# Patient Record
Sex: FEMALE | Race: WHITE
[De-identification: names, ages, dates, MRNs, and addresses within clinical notes are randomized per-mention and may not be internally consistent; named-entity substitution may affect disease eponyms.]

---

## 2017-01-17 ENCOUNTER — HOSPITAL ENCOUNTER (OUTPATIENT)
Dept: HOSPITAL 75 - ONC | Age: 82
LOS: 90 days | Discharge: HOME | End: 2017-04-17
Attending: INTERNAL MEDICINE
Payer: MEDICARE

## 2017-01-17 DIAGNOSIS — Z79.899: ICD-10-CM

## 2017-01-17 DIAGNOSIS — I10: ICD-10-CM

## 2017-01-17 DIAGNOSIS — E78.00: ICD-10-CM

## 2017-01-17 DIAGNOSIS — E03.9: ICD-10-CM

## 2017-01-17 DIAGNOSIS — I25.10: ICD-10-CM

## 2017-01-17 DIAGNOSIS — C18.7: Primary | ICD-10-CM

## 2017-01-17 DIAGNOSIS — E11.9: ICD-10-CM

## 2017-01-17 LAB
ALBUMIN SERPL-MCNC: 4.1 G/DL (ref 3.2–4.5)
ALT SERPL-CCNC: 13 U/L (ref 0–55)
ANION GAP SERPL CALC-SCNC: 10 MMOL/L (ref 5–14)
AST SERPL-CCNC: 15 U/L (ref 5–34)
BASOPHILS # BLD AUTO: 0.1 10^3/UL (ref 0–0.1)
BASOPHILS NFR BLD AUTO: 1 % (ref 0–10)
BILIRUB SERPL-MCNC: 0.3 MG/DL (ref 0.1–1)
BUN SERPL-MCNC: 19 MG/DL (ref 7–18)
BUN/CREAT SERPL: 17
CALCIUM SERPL-MCNC: 9 MG/DL (ref 8.5–10.1)
CHLORIDE SERPL-SCNC: 107 MMOL/L (ref 98–107)
CO2 SERPL-SCNC: 23 MMOL/L (ref 21–32)
CREAT SERPL-MCNC: 1.13 MG/DL (ref 0.6–1.3)
EOSINOPHIL # BLD AUTO: 0.2 10^3/UL (ref 0–0.3)
EOSINOPHIL NFR BLD AUTO: 3 % (ref 0–10)
ERYTHROCYTE [DISTWIDTH] IN BLOOD BY AUTOMATED COUNT: 13.7 % (ref 10–14.5)
GFR SERPLBLD BASED ON 1.73 SQ M-ARVRAT: 45 ML/MIN
GLUCOSE SERPL-MCNC: 146 MG/DL (ref 70–105)
LYMPHOCYTES # BLD AUTO: 2.4 X 10^3 (ref 1–4)
LYMPHOCYTES NFR BLD AUTO: 28 % (ref 12–44)
MCH RBC QN AUTO: 32 PG (ref 25–34)
MCHC RBC AUTO-ENTMCNC: 34 G/DL (ref 32–36)
MCV RBC AUTO: 96 FL (ref 80–99)
MONOCYTES # BLD AUTO: 0.6 X 10^3 (ref 0–1)
MONOCYTES NFR BLD AUTO: 7 % (ref 0–12)
NEUTROPHILS # BLD AUTO: 5.4 X 10^3 (ref 1.8–7.8)
NEUTROPHILS NFR BLD AUTO: 62 % (ref 42–75)
PLATELET # BLD: 173 10^3/UL (ref 130–400)
PMV BLD AUTO: 9.9 FL (ref 7.4–10.4)
POTASSIUM SERPL-SCNC: 4.5 MMOL/L (ref 3.6–5)
PROT SERPL-MCNC: 6.5 G/DL (ref 6.4–8.2)
RBC # BLD AUTO: 4.04 10^6/UL (ref 4.35–5.85)
SODIUM SERPL-SCNC: 140 MMOL/L (ref 135–145)
WBC # BLD AUTO: 8.7 10^3/UL (ref 4.3–11)

## 2017-01-17 PROCEDURE — 85025 COMPLETE CBC W/AUTO DIFF WBC: CPT

## 2017-01-17 PROCEDURE — 36415 COLL VENOUS BLD VENIPUNCTURE: CPT

## 2017-01-17 PROCEDURE — 99213 OFFICE O/P EST LOW 20 MIN: CPT

## 2017-01-17 PROCEDURE — 80053 COMPREHEN METABOLIC PANEL: CPT

## 2017-01-17 PROCEDURE — 82378 CARCINOEMBRYONIC ANTIGEN: CPT

## 2017-01-17 PROCEDURE — 84443 ASSAY THYROID STIM HORMONE: CPT

## 2017-01-17 NOTE — XMS REPORT
Continuity of Care Document

 Created on: 2016



VIOLETA TELLO

External Reference #: [id number]

: 1928

Sex: Female



Demographics







 Address  [address]

[place]  [address]

 

 Home Phone  [phone]

 

 Preferred Language  English

 

 Marital Status  Unknown

 

 Caodaism Affiliation  Unknown

 

 Race  Unknown

 

 Ethnic Group  Unknown





Author







 Author  [facility]

 

 Organization  [facility]

 

 Address  Unknown

 

 Phone  Unavailable







Support







 Name  Relationship  Address  Phone

 

 EDUARDO WILSON MD  Caregiver  Unknown  [phone]

 

 ROSE GUADARRAMA DO  Caregiver  [address]

[address]

[place]  [address] [phone]

 

 AUGUSTUS MCGHEEA JARETT DO  Caregiver  [facility] EMERGENCY DEPT

[place]  [address] [phone]

 

 MARITO BLOCK  Next Of Kin  [address] [place]  [address] [phone]







Care Team Providers







 Care Team Member Name  Role  Phone

 

 EDUARDO WILSON MD  PCP  [phone]







Insurance Providers







 Payer Name  Policy Number  Subscriber Name  Relationship

 

 [employer]  [id number]  Violeta Tello  18 Self / Same As Patient

 

 [employer] [facility] Supp  [id number]  Violeta Tello  18 Self / Same As 
Patient







Advance Directives







 Directive  Response  Recorded Date/Time

 

 Advance Directives  Yes  16 2:05am

 

 Health Care Power of   Yes  16 2:05am

 

 Organ Donor  No  16 2:05am

 

 Resuscitation Status  Full Code  16 2:05am







Chief Complaint and Reason for Visit







 Chief Complaint  SMALL BOWEL OBSTRUCTION DUE TO ADHESIONS

 

 Reason for Visit  Acute renal insufficiency

Hypertension

Hypothyroid

Small bowel obstruction

Small bowel obstruction due to adhesions







Problems

Active Problems







 Medical Problem  Onset Date  Status

 

 Acute renal insufficiency  Unknown  Resolved

 

 Chest pain of uncertain etiology  Unknown  Acute

 

 Hypertension  Unknown  Chronic

 

 Hypothyroid  Unknown  Chronic

 

 Small bowel obstruction  Unknown  Acute

 

 Small bowel obstruction due to adhesions  Unknown  Resolved







Medications

Current Home Medications







 Medication  Dose  Units  Route  Directions  Days/Qty  Instructions  Start Date

 

 Atenolol 50 Mg  50  Mg  Oral  Daily        09

 

 Losartan Potassium 100 Mg  100  Mg  Oral  Daily        13

 

 Aspirin 325 Mg  325  Mg  Oral  Bedtime        13

 

 Folic Acid/Mv,Fe,Other Min 1 Each  1  Tab  Oral  Daily        14

 

 Cholecalciferol (Vitamin D3) 400 Unit  400  Unit  Oral  Daily        10/22/15

 

 Krill/Om-3/Dha/Epa/Phospho/Ast 1 Each  1  Cap  Oral  Bedtime        10/22/15

 

 Levothyroxine Sodium 75 Mcg  75  Mcg  Oral  Bedtime        16





Past Home Medications







 Medication  Directions  Ordered  Status

 

 Multivitamins 1 Tab Tablet, 1 Tab Oral  Daily  09  Discontinued

 

 Vitamin E 400 Unit Capsule, 400 U Oral  Daily  09  Discontinued

 

 Levothyroxine Sodium (Levothroid) 150 Mcg Tablet, 150 Mcg Oral  Daily    Discontinued

 

 Olmesartn/Hydrochlorothiazide 1 Tab Tablet, 12.5 Mg Oral  Daily  09  
Discontinued

 

 Carisoprodol 350 Mg Tablet, 350 Mg Oral  As Needed  09  Discontinued

 

 Hydrocodone Bit/Acetaminophen 1 Each Capsule, 1 Tab Oral  As Needed  09  
Discontinued

 

 Naproxen Sodium 220 Mg Tablet, 220 Mg Oral  As Needed  09  Discontinued

 

 Acetaminophen 500 Mg Tablet, 500 Mg Oral  As Needed  09  Discontinued

 

 Promethazine Hcl/Codeine 5 Ml Syrp,     09  Discontinued

 

 Clarithromycin 500 Mg Tab,     09  Discontinued

 

 Prednisone 2.5 Mg Tablet,     09  Discontinued

 

 Alendronate Sodium 70 Mg Tab, 70 Mg Oral  Daily  09  Discontinued

 

 Levothyroxine Sodium 175 Mcg Tablet, 175 Mcg Oral  Bedtime  11  
Discontinued

 

 Lovastatin (Mevacor) 20 Mg Tablet, 40 Mg Oral  Bedtime  11  Discontinued

 

 Fish Oil 1,000 Mg Cap, 1000 Mg Oral  Three Times A Day  11  Discontinued

 

 Atenolol 50 Mg Tablet, 1 Each Oral  Daily  11  Discontinued

 

 Olmesartan 20 Mg Tablet, 40 Mg Oral  Daily  11  Discontinued

 

 Ca Cmb No.1/Vit D3/B-6/Fa/B12 1 Each Tablet, 1 Each Oral  Daily  11  
Discontinued

 

 Clopidogrel Bisulfate 75 Mg Tablet, 1 Each Oral  Daily  12  Discontinued

 

 Aspirin 81 Mg Tabec, 81 Mg Oral  Daily  12  Discontinued

 

 Aspirin 325 Mg Tab, 325 Mg Oral  Daily  12  Discontinued

 

 Naproxen Sodium 220 Mg Capsule, 220 Mg Oral  As Needed  12  Discontinued

 

 Vitamin E 400 Unit Capsule, 400 Unit Oral  Daily  12  Discontinued

 

 Cholecalciferol 1,000 Unit Tablet, 1000 Unit Oral  Daily  12  
Discontinued

 

 [Ibuprofen] ,  As Needed  12  Discontinued

 

 Cholecalciferol (Vitamin D3) 400 Unit Tablet, 400 Unit Oral  Daily  13  
Discontinued

 

 Levothyroxine Sodium (Levothroid) 150 Mcg Tablet, 150 Mcg Oral  Bedtime    Discontinued

 

 Krill Oil/Omega-3/Dha/Epa 1 Each Capsule, 1 Cap Oral  Twice A Day  13  
Discontinued

 

 Acetaminophen 325 Mg Tab, 650 Mg Oral  Every 6 Hours as needed  13  
Discontinued

 

 Enoxaparin Sodium 30 Mg/0.3 Ml Disp.syrin, 30 Mg Sub-Q  Give Every 12 Hrs On 
Schedule  13  Discontinued

 

 Losartan Potassium 25 Mg Tablet, 25 Mg Oral  Daily  13  Discontinued

 

 Ondansetron Hcl 4 Mg/2 Ml Soln, 4 Mg Intraven  Every 6 Hours as needed    Discontinued

 

 Oxycodone Hcl/Acetaminophen 1 Each Tablet, 1 Each Oral  Q2hrs as needed    Discontinued

 

 Senna 1 Ea Tablet, 2 Ea Oral  Twice A Day  13  Discontinued

 

 [Cathete] , 10 Ml Intraven  As Needed as needed  13  Discontinued

 

 [Cath] , 10 Ml Intraven  Every 8HRS  13  Discontinued

 

 Temazepam 15 Mg Capsule, 15 Mg Oral  Bedtime as needed  13  Discontinued

 

 Cholecalciferol (Vitamin D3) 400 Unit Tablet, 400 Unit Oral  Daily  13  
Discontinued

 

 [Saline Flush] , 10 Ml Intraven  Every 8HRS  13  Discontinued

 

 [Saline Flush] , 10 Ml Intraven  As Needed  13  Discontinued

 

 Oxycodone Hcl/Acetaminophen 1 Each Tablet, 1 Each Oral  Bedtime  13  
Discontinued

 

 Losartan Potassium 25 Mg Tablet, 25 Mg Oral  Daily  13  Discontinued

 

 Senna 1 Tab Tablet, 2 Tab Oral  Twice A Day as needed  13  Discontinued

 

 Ciprofloxacin 500 Mg Tablet, 1 Tab Oral  Twice A Day  13  Discontinued

 

 Vitamin E 400 Unit Capsule, 400 Unit Oral  Daily  09/10/13  Discontinued

 

 [Nicholas Red] , 1 Cap Oral  Bedtime  09/10/13  Discontinued

 

 Lovastatin (Mevacor) 40 Mg Tablet, 40 Mg Oral  Bedtime  09/10/13  Discontinued

 

 Atorvastatin Calcium 10 Mg Tablet, 10 Mg Oral  Bedtime  14  Discontinued

 

 Vitamin E (Dl,Tocopheryl Acet) 200 Unit Capsule, 200 Unit Oral  Daily    Discontinued

 

 Atorvastatin Calcium 20 Mg Tablet, 20 Mg Oral  Bedtime  14  Discontinued

 

 Famotidine (Pepcid) 20 Mg Tablet, 1 Each Oral  Daily  14  Discontinued

 

 Vitamin E (Dl,Tocopheryl Acet) 200 Unit Capsule, 200 Unit Oral  Daily  10/22/
15  Discontinued

 

 Omeprazole 20 Mg Capsule.dr, 20 Mg Oral  Daily  10/23/15  Discontinued







Social History







 Social History Problem  Response  Recorded Date/Time

 

 Alcohol Use  Denies Use  2016 2:05am

 

 Recreational Drug Use  No  2016 2:05am

 

 Recent Foreign Travel  No  2014 10:30am

 

 Recent Infectious Disease Exposure  No  2014 10:30am

 

 Hospitalization with Isolation  Denies  2014 10:30am

 

 Smoking Status  Current Everyday Smoker  2016 2:05am

 

 Do you dip or chew tobacco?  No  2016 9:21pm









 Query  Response  Start Date  Stop Date

 

 Smoking Status  Current Everyday Smoker     2015







Hospital Discharge Instructions

No hospital discharge instructions.



Plan of Care







 Discharge Date  16 2:10pm

 

 Disposition  01 HOME, SELF-CARE

 

 Instructions/Education Provided  Bowel Obstruction (DC)

 

 Forms Provided  [facility]

 

 Prescriptions  See Medication Section

 

 Referrals   (Unspecified) - 1 Week







Functional Status







 Query  Response  Date Recorded

 

 Patient Orientation  Person

Place

Time

Situation

  2016 2:57pm

 

 Comprehension Ability  Understands Concepts

  2016 2:05am







Allergies, Adverse Reactions, Alerts







 Allergen  Type  Severity  Reaction  Status  Last Updated

 

 Sulfa (Sulfonamide Antibiotics) ([id number])  Allergy  Unknown     Active  







Immunizations







 Name  Given  Type

 

 Date of Pneumonia Vaccine  10/01/13  Historical

 

 Date of Influenza Vaccine  10/01/13  Historical

 

 Tetanus Booster (TDap)  Unknown  Historical







Vital Signs

Acute Vital Signs





 Vital  Response  Date/Time

 

 Temperature (Fahrenheit)  98.8 degrees F (97.6 - 99.5)  2016 2:05pm

 

 Temperature (Calculated Celsius)  37.07043 degrees C (36.4 - 37.5)  2016 
10:00am

 

 Temperature Source  Tympanic  2016 2:05pm

 

 Pulse Rate (adult)  60 bpm (60 - 90)  2016 2:05pm

 

 Respiratory Rate  16 bpm (12 - 24)  2016 2:05pm

 

 O2 Sat by Pulse Oximetry  93 % (88 - 100)  2016 2:05pm

 

 Blood Pressure  131/62 mm Hg  2016 2:05pm

 

    Blood Pressure Mean  85 mm Hg  2016 10:00am

 

 Pain      

 

    Pain Intensity  0  2016 10:00am

 

 Height (Feet)  5 feet  2016 2:05am

 

 Height (Inches)  1.00 inches  2016 2:05am

 

 Height (Calculated Centimeters)  154.762143 cm  2016 2:05am

 

 Weight (Pounds)  142 pounds  2016 2:05am

 

 Weight (Ounces)  0.0 oz  2016 2:05am

 

 Weight (Calculated Grams)  25310.117 gm  2016 2:05am

 

 Weight (Calculated Kilograms)  64.260029 kilograms  2016 2:05am

 

 Calculated BMI  26.8  2016 2:05am







Results

Laboratory Results







 Test Name  Result  Units  Flags  Reference  Collection Date/Time  Result Date/
Time  Comments

 

 White Blood Count  8.1  10^3/uL     4.3-11.0  2016 5:17am  2016 5:
52am   

 

 Red Blood Count  3.53  10^6/uL  L  4.35-5.85  2016 5:2016 5:
52am   

 

 Hemoglobin  11.3  G/DL  L  11.5-16.0  2016 5:2016 5:52am   

 

 Hematocrit  35  %     35-52  2016 5:2016 5:52am   

 

 Mean Corpuscular Volume  98  FL     80-99  2016 5:2016 5:
52am   

 

 Mean Corpuscular Hemoglobin  32  PG     25-34  2016 5:17a2016 5:
52am   

 

 Mean Corpuscular Hemoglobin Concent  33  G/DL     32-36  2016 5:17a 5:52am   

 

 Red Cell Distribution Width  13.8  %     10.0-14.5  2016 5:17am  2016 5:52am   

 

 Platelet Count  133  10^3/uL     130-400  2016 5:2016 5:52am
   

 

 Mean Platelet Volume  11.0  FL  H  7.4-10.4  2016 5:2016 5:
52am   

 

 Neutrophils (%) (Auto)  59  %     42-75  2016 5:2016 5:52am 
  

 

 Lymphocytes (%) (Auto)  28  %     12-44  2016 5:2016 5:52am 
  

 

 Monocytes (%) (Auto)  8  %     0-12  2016 5:2016 5:52am   

 

 Eosinophils (%) (Auto)  5  %     0-10  2016 5:2016 5:52am   

 

 Basophils (%) (Auto)  0  %     0-10  2016 5:2016 5:52am   

 

 Neutrophils # (Auto)  4.8  X 10^3     1.8-7.8  2016 5:2016 5:
52am   

 

 Lymphocytes # (Auto)  2.3  X 10^3     1.0-4.0  2016 5:2016 5:
52am   

 

 Monocytes # (Auto)  0.6  X 10^3     0.0-1.0  2016 5:2016 5:
52am   

 

 Eosinophils # (Auto)  0.4  10^3/uL  H  0.0-0.3  2016 5:2016 5
:52am   

 

 Basophils # (Auto)  0.0  10^3/uL     0.0-0.1  2016 5:2016 5:
52am   

 

 Neutrophils % (Manual)  68  %        2016 9:20pm  2016 10:13pm   

 

 Band Neutrophils  1  %        2016 9:20pm  2016 10:13pm   

 

 Lymphocytes % (Manual)  28  %        2016 9:20pm  2016 10:13pm   

 

 Monocytes % (Manual)  1  %        2016 9:20pm  2016 10:13pm   

 

 Eosinophils % (Manual)  1  %        2016 9:20pm  2016 10:13pm   

 

 Basophils % (Manual)  1  %        2016 9:20pm  2016 10:13pm   

 

 Blood Morphology Comment  NORMAL           2016 9:20pm  2016 10:
13pm   

 

 Urine Color  YELLOW           2016 4:37am  2016 4:53am   

 

 Urine Clarity  VERY CLOUDY     *     2016 4:37am  2016 4:53am   

 

 Urine pH  6        5-9  2016 4:37am  2016 4:53am   

 

 Urine Specific Gravity  1.015     *  1.016-1.022  2016 4:37am  2016 4:53am   

 

 Urine Protein  2+     *  NEGATIVE  2016 4:37am  2016 4:53am   

 

 Urine Glucose (UA)  NEGATIVE        NEGATIVE  2016 4:37am  2016 4:
53am   

 

 Urine RBC (Auto)  NEGATIVE        NEGATIVE  2016 4:37am  2016 4:
53am   

 

 Urine Ketones  NEGATIVE        NEGATIVE  2016 4:37am  2016 4:53am 
  

 

 Urine Nitrite  NEGATIVE        NEGATIVE  2016 4:37am  2016 4:53am 
  

 

 Urine Bilirubin  NEGATIVE        NEGATIVE  2016 4:37am  2016 4:
53am   

 

 Urine Urobilinogen  1  MG/DL     NORMAL  2016 4:37am  2016 4:53am 
  

 

 Urine Leukocyte Esterase  2+     *  NEGATIVE  2016 4:37am  2016 4:
53am   

 

 Urine RBC  NONE  /HPF        2016 4:37am  2016 4:53am   

 

 Urine WBC  10-25  /HPF  *     2016 4:37am  2016 4:53am   

 

 Urine Bacteria  LARGE  /HPF  *     2016 4:37am  2016 4:53am   

 

 Urine Squamous Epithelial Cells  NONE  /HPF        2016 4:37am  2016 4:53am   

 

 Urine Crystals  NONE  /LPF        2016 4:37am  2016 4:53am   

 

 Urine Casts  NONE  /LPF        2016 4:37am  2016 4:53am   

 

 Urine Mucus  NEGATIVE  /LPF        2016 4:37am  2016 4:53am   

 

 Urine Culture Indicated  YES           2016 4:37am  2016 4:53am   

 

 Sodium Level  141  MMOL/L     135-145  2016 5:2016 6:12am   

 

 Potassium Level  4.3  MMOL/L     3.6-5.0  2016 5:2016 6:12am
   

 

 Chloride Level  108  MMOL/L  H    2016 5:2016 6:12am   

 

 Carbon Dioxide Level  24  MMOL/L     21-32  2016 5:2016 6:
12am   

 

 Anion Gap  9  MMOL/L     5-14  2016 5:2016 6:12am   

 

 Blood Urea Nitrogen  22  MG/DL  H  7-18  2016 5:2016 6:12am 
  

 

 Creatinine  1.14  MG/DL     0.60-1.30  2016 5:2016 6:12am   

 

 BUN/Creatinine Ratio  19           2016 5:2016 6:12am   

 

 Estimat Glomerular Filtration Rate  45           2016 5:2016 
6:12am                    GFR INTERPRETIVE DATA  

  

         UNITS FOR ESTIMATED GFR (eGFR): mL/min/1.73 M2  

         REFERENCE RANGE FOR ESTIMATED GFR (eGFR)  

                                          eGFR  

         NORMAL eGFR                       >60  

         MODERATELY DECREASED eGFR          30-59  

         SEVERLY DECREASED eGFR             15-29  

         KIDNEY FAILURE                    <15 (OR DIALYSIS)  

 

 Glucose Level  133  MG/DL  H    2016 5:2016 6:12am   

 

 Calcium Level  8.4  MG/DL  L  8.5-10.1  2016 5:2016 6:12am   

 

 Total Bilirubin  0.7  MG/DL     0.1-1.0  2016 5:2016 6:12am 
  

 

 Alkaline Phosphatase  45  U/L       2016 5:2016 6:12am
   

 

 Aspartate Amino Transf (AST/SGOT)  16  U/L     5-34  2016 5:2016 6:12am   

 

 Alanine Aminotransferase (ALT/SGPT)  12  U/L     0-55  2016 5:17am   6:12am   

 

 Total Protein  5.0  G/DL  L  6.4-8.2  2016 5:17am  2016 6:12am   

 

 Albumin  3.2  G/DL     3.2-4.5  2016 5:17am  2016 6:12am   

 

 Amylase Level  60  U/L       2016 9:20pm  2016 10:06pm   

 

 Lipase  11  U/L     8-78  2016 9:20pm  2016 10:06pm   





Microbiology Results







 Procedure  Source  Result  Collection Date/Time  Result Date/Time

 

 Urine Culture  Urine, Clean Catch  PROBABLE E.COLI  2016 4:37am  2016 7:44am







Procedures

No known history of procedures.



Encounters







 Encounter  Location  Arrival/Admit Date  Discharge/Depart Date  Attending 
Provider

 

 Discharged Inpatient  [facility]  16 1:29am   2:10pm  ROSE GUADARRAMA DO











 Recent Diagnosis

 

 Acute renal insufficiency

 

 Hypertension

 

 Hypothyroid

 

 Small bowel obstruction

 

 Small bowel obstruction due to adhesions

## 2017-01-31 ENCOUNTER — HOSPITAL ENCOUNTER (OUTPATIENT)
Dept: HOSPITAL 75 - RAD | Age: 82
End: 2017-01-31
Attending: NURSE PRACTITIONER
Payer: MEDICARE

## 2017-01-31 DIAGNOSIS — Z99.81: ICD-10-CM

## 2017-01-31 DIAGNOSIS — R05: Primary | ICD-10-CM

## 2017-01-31 DIAGNOSIS — R07.81: ICD-10-CM

## 2017-01-31 DIAGNOSIS — Z91.81: ICD-10-CM

## 2017-01-31 PROCEDURE — 71020: CPT

## 2017-01-31 PROCEDURE — 71100 X-RAY EXAM RIBS UNI 2 VIEWS: CPT

## 2017-01-31 NOTE — XMS REPORT
Continuity of Care Document

 Created on: 2016



VIOLETA TELLO

External Reference #: [id number]

: 1928

Sex: Female



Demographics







 Address  [address]

[place]  [address]

 

 Home Phone  [phone]

 

 Preferred Language  English

 

 Marital Status  Unknown

 

 Buddhism Affiliation  Unknown

 

 Race  Unknown

 

 Ethnic Group  Unknown





Author







 Author  [facility]

 

 Organization  [facility]

 

 Address  Unknown

 

 Phone  Unavailable







Support







 Name  Relationship  Address  Phone

 

 EDUARDO WILSON MD  Caregiver  Unknown  [phone]

 

 ROSE GUADARRAMA DO  Caregiver  [address]

[address]

[place]  [address] [phone]

 

 AUGUSTUS MCGHEEA JARETT DO  Caregiver  [facility] EMERGENCY DEPT

[place]  [address] [phone]

 

 MARITO BLOCK  Next Of Kin  [address] [place]  [address] [phone]







Care Team Providers







 Care Team Member Name  Role  Phone

 

 EDUARDO WILSON MD  PCP  [phone]







Insurance Providers







 Payer Name  Policy Number  Subscriber Name  Relationship

 

 [employer]  [id number]  Violeta Tello  18 Self / Same As Patient

 

 [employer] [facility] Supp  [id number]  Violeta Tello  18 Self / Same As 
Patient







Advance Directives







 Directive  Response  Recorded Date/Time

 

 Advance Directives  Yes  16 2:05am

 

 Health Care Power of   Yes  16 2:05am

 

 Organ Donor  No  16 2:05am

 

 Resuscitation Status  Full Code  16 2:05am







Chief Complaint and Reason for Visit







 Chief Complaint  SMALL BOWEL OBSTRUCTION DUE TO ADHESIONS

 

 Reason for Visit  Acute renal insufficiency

Hypertension

Hypothyroid

Small bowel obstruction

Small bowel obstruction due to adhesions







Problems

Active Problems







 Medical Problem  Onset Date  Status

 

 Acute renal insufficiency  Unknown  Resolved

 

 Chest pain of uncertain etiology  Unknown  Acute

 

 Hypertension  Unknown  Chronic

 

 Hypothyroid  Unknown  Chronic

 

 Small bowel obstruction  Unknown  Acute

 

 Small bowel obstruction due to adhesions  Unknown  Resolved







Medications

Current Home Medications







 Medication  Dose  Units  Route  Directions  Days/Qty  Instructions  Start Date

 

 Atenolol 50 Mg  50  Mg  Oral  Daily        09

 

 Losartan Potassium 100 Mg  100  Mg  Oral  Daily        13

 

 Aspirin 325 Mg  325  Mg  Oral  Bedtime        13

 

 Folic Acid/Mv,Fe,Other Min 1 Each  1  Tab  Oral  Daily        14

 

 Cholecalciferol (Vitamin D3) 400 Unit  400  Unit  Oral  Daily        10/22/15

 

 Krill/Om-3/Dha/Epa/Phospho/Ast 1 Each  1  Cap  Oral  Bedtime        10/22/15

 

 Levothyroxine Sodium 75 Mcg  75  Mcg  Oral  Bedtime        16





Past Home Medications







 Medication  Directions  Ordered  Status

 

 Multivitamins 1 Tab Tablet, 1 Tab Oral  Daily  09  Discontinued

 

 Vitamin E 400 Unit Capsule, 400 U Oral  Daily  09  Discontinued

 

 Levothyroxine Sodium (Levothroid) 150 Mcg Tablet, 150 Mcg Oral  Daily    Discontinued

 

 Olmesartn/Hydrochlorothiazide 1 Tab Tablet, 12.5 Mg Oral  Daily  09  
Discontinued

 

 Carisoprodol 350 Mg Tablet, 350 Mg Oral  As Needed  09  Discontinued

 

 Hydrocodone Bit/Acetaminophen 1 Each Capsule, 1 Tab Oral  As Needed  09  
Discontinued

 

 Naproxen Sodium 220 Mg Tablet, 220 Mg Oral  As Needed  09  Discontinued

 

 Acetaminophen 500 Mg Tablet, 500 Mg Oral  As Needed  09  Discontinued

 

 Promethazine Hcl/Codeine 5 Ml Syrp,     09  Discontinued

 

 Clarithromycin 500 Mg Tab,     09  Discontinued

 

 Prednisone 2.5 Mg Tablet,     09  Discontinued

 

 Alendronate Sodium 70 Mg Tab, 70 Mg Oral  Daily  09  Discontinued

 

 Levothyroxine Sodium 175 Mcg Tablet, 175 Mcg Oral  Bedtime  11  
Discontinued

 

 Lovastatin (Mevacor) 20 Mg Tablet, 40 Mg Oral  Bedtime  11  Discontinued

 

 Fish Oil 1,000 Mg Cap, 1000 Mg Oral  Three Times A Day  11  Discontinued

 

 Atenolol 50 Mg Tablet, 1 Each Oral  Daily  11  Discontinued

 

 Olmesartan 20 Mg Tablet, 40 Mg Oral  Daily  11  Discontinued

 

 Ca Cmb No.1/Vit D3/B-6/Fa/B12 1 Each Tablet, 1 Each Oral  Daily  11  
Discontinued

 

 Clopidogrel Bisulfate 75 Mg Tablet, 1 Each Oral  Daily  12  Discontinued

 

 Aspirin 81 Mg Tabec, 81 Mg Oral  Daily  12  Discontinued

 

 Aspirin 325 Mg Tab, 325 Mg Oral  Daily  12  Discontinued

 

 Naproxen Sodium 220 Mg Capsule, 220 Mg Oral  As Needed  12  Discontinued

 

 Vitamin E 400 Unit Capsule, 400 Unit Oral  Daily  12  Discontinued

 

 Cholecalciferol 1,000 Unit Tablet, 1000 Unit Oral  Daily  12  
Discontinued

 

 [Ibuprofen] ,  As Needed  12  Discontinued

 

 Cholecalciferol (Vitamin D3) 400 Unit Tablet, 400 Unit Oral  Daily  13  
Discontinued

 

 Levothyroxine Sodium (Levothroid) 150 Mcg Tablet, 150 Mcg Oral  Bedtime    Discontinued

 

 Krill Oil/Omega-3/Dha/Epa 1 Each Capsule, 1 Cap Oral  Twice A Day  13  
Discontinued

 

 Acetaminophen 325 Mg Tab, 650 Mg Oral  Every 6 Hours as needed  13  
Discontinued

 

 Enoxaparin Sodium 30 Mg/0.3 Ml Disp.syrin, 30 Mg Sub-Q  Give Every 12 Hrs On 
Schedule  13  Discontinued

 

 Losartan Potassium 25 Mg Tablet, 25 Mg Oral  Daily  13  Discontinued

 

 Ondansetron Hcl 4 Mg/2 Ml Soln, 4 Mg Intraven  Every 6 Hours as needed    Discontinued

 

 Oxycodone Hcl/Acetaminophen 1 Each Tablet, 1 Each Oral  Q2hrs as needed    Discontinued

 

 Senna 1 Ea Tablet, 2 Ea Oral  Twice A Day  13  Discontinued

 

 [Cathete] , 10 Ml Intraven  As Needed as needed  13  Discontinued

 

 [Cath] , 10 Ml Intraven  Every 8HRS  13  Discontinued

 

 Temazepam 15 Mg Capsule, 15 Mg Oral  Bedtime as needed  13  Discontinued

 

 Cholecalciferol (Vitamin D3) 400 Unit Tablet, 400 Unit Oral  Daily  13  
Discontinued

 

 [Saline Flush] , 10 Ml Intraven  Every 8HRS  13  Discontinued

 

 [Saline Flush] , 10 Ml Intraven  As Needed  13  Discontinued

 

 Oxycodone Hcl/Acetaminophen 1 Each Tablet, 1 Each Oral  Bedtime  13  
Discontinued

 

 Losartan Potassium 25 Mg Tablet, 25 Mg Oral  Daily  13  Discontinued

 

 Senna 1 Tab Tablet, 2 Tab Oral  Twice A Day as needed  13  Discontinued

 

 Ciprofloxacin 500 Mg Tablet, 1 Tab Oral  Twice A Day  13  Discontinued

 

 Vitamin E 400 Unit Capsule, 400 Unit Oral  Daily  09/10/13  Discontinued

 

 [Nicholas Red] , 1 Cap Oral  Bedtime  09/10/13  Discontinued

 

 Lovastatin (Mevacor) 40 Mg Tablet, 40 Mg Oral  Bedtime  09/10/13  Discontinued

 

 Atorvastatin Calcium 10 Mg Tablet, 10 Mg Oral  Bedtime  14  Discontinued

 

 Vitamin E (Dl,Tocopheryl Acet) 200 Unit Capsule, 200 Unit Oral  Daily    Discontinued

 

 Atorvastatin Calcium 20 Mg Tablet, 20 Mg Oral  Bedtime  14  Discontinued

 

 Famotidine (Pepcid) 20 Mg Tablet, 1 Each Oral  Daily  14  Discontinued

 

 Vitamin E (Dl,Tocopheryl Acet) 200 Unit Capsule, 200 Unit Oral  Daily  10/22/
15  Discontinued

 

 Omeprazole 20 Mg Capsule.dr, 20 Mg Oral  Daily  10/23/15  Discontinued







Social History







 Social History Problem  Response  Recorded Date/Time

 

 Alcohol Use  Denies Use  2016 2:05am

 

 Recreational Drug Use  No  2016 2:05am

 

 Recent Foreign Travel  No  2014 10:30am

 

 Recent Infectious Disease Exposure  No  2014 10:30am

 

 Hospitalization with Isolation  Denies  2014 10:30am

 

 Smoking Status  Current Everyday Smoker  2016 2:05am

 

 Do you dip or chew tobacco?  No  2016 9:21pm









 Query  Response  Start Date  Stop Date

 

 Smoking Status  Current Everyday Smoker     2015







Hospital Discharge Instructions

No hospital discharge instructions.



Plan of Care







 Discharge Date  16 2:10pm

 

 Disposition  01 HOME, SELF-CARE

 

 Instructions/Education Provided  Bowel Obstruction (DC)

 

 Forms Provided  [facility]

 

 Prescriptions  See Medication Section

 

 Referrals   (Unspecified) - 1 Week







Functional Status







 Query  Response  Date Recorded

 

 Patient Orientation  Person

Place

Time

Situation

  2016 2:57pm

 

 Comprehension Ability  Understands Concepts

  2016 2:05am







Allergies, Adverse Reactions, Alerts







 Allergen  Type  Severity  Reaction  Status  Last Updated

 

 Sulfa (Sulfonamide Antibiotics) ([id number])  Allergy  Unknown     Active  







Immunizations







 Name  Given  Type

 

 Date of Pneumonia Vaccine  10/01/13  Historical

 

 Date of Influenza Vaccine  10/01/13  Historical

 

 Tetanus Booster (TDap)  Unknown  Historical







Vital Signs

Acute Vital Signs





 Vital  Response  Date/Time

 

 Temperature (Fahrenheit)  98.8 degrees F (97.6 - 99.5)  2016 2:05pm

 

 Temperature (Calculated Celsius)  37.35675 degrees C (36.4 - 37.5)  2016 
10:00am

 

 Temperature Source  Tympanic  2016 2:05pm

 

 Pulse Rate (adult)  60 bpm (60 - 90)  2016 2:05pm

 

 Respiratory Rate  16 bpm (12 - 24)  2016 2:05pm

 

 O2 Sat by Pulse Oximetry  93 % (88 - 100)  2016 2:05pm

 

 Blood Pressure  131/62 mm Hg  2016 2:05pm

 

    Blood Pressure Mean  85 mm Hg  2016 10:00am

 

 Pain      

 

    Pain Intensity  0  2016 10:00am

 

 Height (Feet)  5 feet  2016 2:05am

 

 Height (Inches)  1.00 inches  2016 2:05am

 

 Height (Calculated Centimeters)  154.434284 cm  2016 2:05am

 

 Weight (Pounds)  142 pounds  2016 2:05am

 

 Weight (Ounces)  0.0 oz  2016 2:05am

 

 Weight (Calculated Grams)  74034.117 gm  2016 2:05am

 

 Weight (Calculated Kilograms)  64.918411 kilograms  2016 2:05am

 

 Calculated BMI  26.8  2016 2:05am







Results

Laboratory Results







 Test Name  Result  Units  Flags  Reference  Collection Date/Time  Result Date/
Time  Comments

 

 White Blood Count  8.1  10^3/uL     4.3-11.0  2016 5:17am  2016 5:
52am   

 

 Red Blood Count  3.53  10^6/uL  L  4.35-5.85  2016 5:2016 5:
52am   

 

 Hemoglobin  11.3  G/DL  L  11.5-16.0  2016 5:2016 5:52am   

 

 Hematocrit  35  %     35-52  2016 5:2016 5:52am   

 

 Mean Corpuscular Volume  98  FL     80-99  2016 5:2016 5:
52am   

 

 Mean Corpuscular Hemoglobin  32  PG     25-34  2016 5:17a2016 5:
52am   

 

 Mean Corpuscular Hemoglobin Concent  33  G/DL     32-36  2016 5:17a 5:52am   

 

 Red Cell Distribution Width  13.8  %     10.0-14.5  2016 5:17am  2016 5:52am   

 

 Platelet Count  133  10^3/uL     130-400  2016 5:2016 5:52am
   

 

 Mean Platelet Volume  11.0  FL  H  7.4-10.4  2016 5:2016 5:
52am   

 

 Neutrophils (%) (Auto)  59  %     42-75  2016 5:2016 5:52am 
  

 

 Lymphocytes (%) (Auto)  28  %     12-44  2016 5:2016 5:52am 
  

 

 Monocytes (%) (Auto)  8  %     0-12  2016 5:2016 5:52am   

 

 Eosinophils (%) (Auto)  5  %     0-10  2016 5:2016 5:52am   

 

 Basophils (%) (Auto)  0  %     0-10  2016 5:2016 5:52am   

 

 Neutrophils # (Auto)  4.8  X 10^3     1.8-7.8  2016 5:2016 5:
52am   

 

 Lymphocytes # (Auto)  2.3  X 10^3     1.0-4.0  2016 5:2016 5:
52am   

 

 Monocytes # (Auto)  0.6  X 10^3     0.0-1.0  2016 5:2016 5:
52am   

 

 Eosinophils # (Auto)  0.4  10^3/uL  H  0.0-0.3  2016 5:2016 5
:52am   

 

 Basophils # (Auto)  0.0  10^3/uL     0.0-0.1  2016 5:2016 5:
52am   

 

 Neutrophils % (Manual)  68  %        2016 9:20pm  2016 10:13pm   

 

 Band Neutrophils  1  %        2016 9:20pm  2016 10:13pm   

 

 Lymphocytes % (Manual)  28  %        2016 9:20pm  2016 10:13pm   

 

 Monocytes % (Manual)  1  %        2016 9:20pm  2016 10:13pm   

 

 Eosinophils % (Manual)  1  %        2016 9:20pm  2016 10:13pm   

 

 Basophils % (Manual)  1  %        2016 9:20pm  2016 10:13pm   

 

 Blood Morphology Comment  NORMAL           2016 9:20pm  2016 10:
13pm   

 

 Urine Color  YELLOW           2016 4:37am  2016 4:53am   

 

 Urine Clarity  VERY CLOUDY     *     2016 4:37am  2016 4:53am   

 

 Urine pH  6        5-9  2016 4:37am  2016 4:53am   

 

 Urine Specific Gravity  1.015     *  1.016-1.022  2016 4:37am  2016 4:53am   

 

 Urine Protein  2+     *  NEGATIVE  2016 4:37am  2016 4:53am   

 

 Urine Glucose (UA)  NEGATIVE        NEGATIVE  2016 4:37am  2016 4:
53am   

 

 Urine RBC (Auto)  NEGATIVE        NEGATIVE  2016 4:37am  2016 4:
53am   

 

 Urine Ketones  NEGATIVE        NEGATIVE  2016 4:37am  2016 4:53am 
  

 

 Urine Nitrite  NEGATIVE        NEGATIVE  2016 4:37am  2016 4:53am 
  

 

 Urine Bilirubin  NEGATIVE        NEGATIVE  2016 4:37am  2016 4:
53am   

 

 Urine Urobilinogen  1  MG/DL     NORMAL  2016 4:37am  2016 4:53am 
  

 

 Urine Leukocyte Esterase  2+     *  NEGATIVE  2016 4:37am  2016 4:
53am   

 

 Urine RBC  NONE  /HPF        2016 4:37am  2016 4:53am   

 

 Urine WBC  10-25  /HPF  *     2016 4:37am  2016 4:53am   

 

 Urine Bacteria  LARGE  /HPF  *     2016 4:37am  2016 4:53am   

 

 Urine Squamous Epithelial Cells  NONE  /HPF        2016 4:37am  2016 4:53am   

 

 Urine Crystals  NONE  /LPF        2016 4:37am  2016 4:53am   

 

 Urine Casts  NONE  /LPF        2016 4:37am  2016 4:53am   

 

 Urine Mucus  NEGATIVE  /LPF        2016 4:37am  2016 4:53am   

 

 Urine Culture Indicated  YES           2016 4:37am  2016 4:53am   

 

 Sodium Level  141  MMOL/L     135-145  2016 5:2016 6:12am   

 

 Potassium Level  4.3  MMOL/L     3.6-5.0  2016 5:2016 6:12am
   

 

 Chloride Level  108  MMOL/L  H    2016 5:2016 6:12am   

 

 Carbon Dioxide Level  24  MMOL/L     21-32  2016 5:2016 6:
12am   

 

 Anion Gap  9  MMOL/L     5-14  2016 5:2016 6:12am   

 

 Blood Urea Nitrogen  22  MG/DL  H  7-18  2016 5:2016 6:12am 
  

 

 Creatinine  1.14  MG/DL     0.60-1.30  2016 5:2016 6:12am   

 

 BUN/Creatinine Ratio  19           2016 5:2016 6:12am   

 

 Estimat Glomerular Filtration Rate  45           2016 5:2016 
6:12am                    GFR INTERPRETIVE DATA  

  

         UNITS FOR ESTIMATED GFR (eGFR): mL/min/1.73 M2  

         REFERENCE RANGE FOR ESTIMATED GFR (eGFR)  

                                          eGFR  

         NORMAL eGFR                       >60  

         MODERATELY DECREASED eGFR          30-59  

         SEVERLY DECREASED eGFR             15-29  

         KIDNEY FAILURE                    <15 (OR DIALYSIS)  

 

 Glucose Level  133  MG/DL  H    2016 5:2016 6:12am   

 

 Calcium Level  8.4  MG/DL  L  8.5-10.1  2016 5:2016 6:12am   

 

 Total Bilirubin  0.7  MG/DL     0.1-1.0  2016 5:2016 6:12am 
  

 

 Alkaline Phosphatase  45  U/L       2016 5:2016 6:12am
   

 

 Aspartate Amino Transf (AST/SGOT)  16  U/L     5-34  2016 5:2016 6:12am   

 

 Alanine Aminotransferase (ALT/SGPT)  12  U/L     0-55  2016 5:17am   6:12am   

 

 Total Protein  5.0  G/DL  L  6.4-8.2  2016 5:17am  2016 6:12am   

 

 Albumin  3.2  G/DL     3.2-4.5  2016 5:17am  2016 6:12am   

 

 Amylase Level  60  U/L       2016 9:20pm  2016 10:06pm   

 

 Lipase  11  U/L     8-78  2016 9:20pm  2016 10:06pm   





Microbiology Results







 Procedure  Source  Result  Collection Date/Time  Result Date/Time

 

 Urine Culture  Urine, Clean Catch  PROBABLE E.COLI  2016 4:37am  2016 7:44am







Procedures

No known history of procedures.



Encounters







 Encounter  Location  Arrival/Admit Date  Discharge/Depart Date  Attending 
Provider

 

 Discharged Inpatient  [facility]  16 1:29am   2:10pm  ROSE GUADARRAMA DO











 Recent Diagnosis

 

 Acute renal insufficiency

 

 Hypertension

 

 Hypothyroid

 

 Small bowel obstruction

 

 Small bowel obstruction due to adhesions

## 2017-01-31 NOTE — DIAGNOSTIC IMAGING REPORT
INDICATION: Right rib injury



3 views of right ribs does not show any displaced fractures.

There is no effusion or pneumothorax.



IMPRESSION: Negative right ribs.



Dictated by:



Dictated on workstation # [id number]

## 2017-01-31 NOTE — DIAGNOSTIC IMAGING REPORT
INDICATION: Shortness of breath and right rib pain.



PA and lateral chest.



Heart and mediastinum are normal. Lungs are clear. There are no

effusions or pneumothoraces.



IMPRESSION: Negative chest.



Dictated by:



Dictated on workstation # [id number]

## 2017-03-07 ENCOUNTER — HOSPITAL ENCOUNTER (OUTPATIENT)
Dept: HOSPITAL 75 - RAD | Age: 82
End: 2017-03-07
Attending: INTERNAL MEDICINE
Payer: MEDICARE

## 2017-03-07 DIAGNOSIS — Z12.31: Primary | ICD-10-CM

## 2017-03-07 PROCEDURE — 77067 SCR MAMMO BI INCL CAD: CPT

## 2017-03-07 NOTE — XMS REPORT
Continuity of Care Document

 Created on: 2016



VIOLETA TELLO

External Reference #: [id number]

: 1928

Sex: Female



Demographics







 Address  [address]

[place]  [address]

 

 Home Phone  [phone]

 

 Preferred Language  English

 

 Marital Status  Unknown

 

 Adventist Affiliation  Unknown

 

 Race  Unknown

 

 Ethnic Group  Unknown





Author







 Author  [facility]

 

 Organization  [facility]

 

 Address  Unknown

 

 Phone  Unavailable







Support







 Name  Relationship  Address  Phone

 

 EDUARDO WILSON MD  Caregiver  Unknown  [phone]

 

 ROSE GUADARRAMA DO  Caregiver  [address]

[address]

[place]  [address] [phone]

 

 AUGUSTUS MCGHEEA JARETT DO  Caregiver  [facility] EMERGENCY DEPT

[place]  [address] [phone]

 

 MARITO BLOCK  Next Of Kin  [address] [place]  [address] [phone]







Care Team Providers







 Care Team Member Name  Role  Phone

 

 EDUARDO WILSON MD  PCP  [phone]







Insurance Providers







 Payer Name  Policy Number  Subscriber Name  Relationship

 

 [employer]  [id number]  Violeta Tello  18 Self / Same As Patient

 

 [employer] [facility] [employer]  [id number]  Violeta Tello  18 Self / Same As 
Patient







Advance Directives







 Directive  Response  Recorded Date/Time

 

 Advance Directives  Yes  16 2:05am

 

 Health Care Power of   Yes  16 2:05am

 

 Organ Donor  No  16 2:05am

 

 Resuscitation Status  Full Code  16 2:05am







Chief Complaint and Reason for Visit







 Chief Complaint  SMALL BOWEL OBSTRUCTION DUE TO ADHESIONS

 

 Reason for Visit  Acute renal insufficiency

Hypertension

Hypothyroid

Small bowel obstruction

Small bowel obstruction due to adhesions







Problems

Active Problems







 Medical Problem  Onset Date  Status

 

 Acute renal insufficiency  Unknown  Resolved

 

 Chest pain of uncertain etiology  Unknown  Acute

 

 Hypertension  Unknown  Chronic

 

 Hypothyroid  Unknown  Chronic

 

 Small bowel obstruction  Unknown  Acute

 

 Small bowel obstruction due to adhesions  Unknown  Resolved







Medications

Current Home Medications







 Medication  Dose  Units  Route  Directions  Days/Qty  Instructions  Start Date

 

 Atenolol 50 Mg  50  Mg  Oral  Daily        09

 

 Losartan Potassium 100 Mg  100  Mg  Oral  Daily        13

 

 Aspirin 325 Mg  325  Mg  Oral  Bedtime        13

 

 Folic Acid/Mv,Fe,Other Min 1 Each  1  Tab  Oral  Daily        14

 

 Cholecalciferol (Vitamin D3) 400 Unit  400  Unit  Oral  Daily        10/22/15

 

 Krill/Om-3/Dha/Epa/Phospho/Ast 1 Each  1  Cap  Oral  Bedtime        10/22/15

 

 Levothyroxine Sodium 75 Mcg  75  Mcg  Oral  Bedtime        16





Past Home Medications







 Medication  Directions  Ordered  Status

 

 Multivitamins 1 Tab Tablet, 1 Tab Oral  Daily  09  Discontinued

 

 Vitamin E 400 Unit Capsule, 400 U Oral  Daily  09  Discontinued

 

 Levothyroxine Sodium (Levothroid) 150 Mcg Tablet, 150 Mcg Oral  Daily    Discontinued

 

 Olmesartn/Hydrochlorothiazide 1 Tab Tablet, 12.5 Mg Oral  Daily  09  
Discontinued

 

 Carisoprodol 350 Mg Tablet, 350 Mg Oral  As Needed  09  Discontinued

 

 Hydrocodone Bit/Acetaminophen 1 Each Capsule, 1 Tab Oral  As Needed  09  
Discontinued

 

 Naproxen Sodium 220 Mg Tablet, 220 Mg Oral  As Needed  09  Discontinued

 

 Acetaminophen 500 Mg Tablet, 500 Mg Oral  As Needed  09  Discontinued

 

 Promethazine Hcl/Codeine 5 Ml Syrp,     09  Discontinued

 

 Clarithromycin 500 Mg Tab,     09  Discontinued

 

 Prednisone 2.5 Mg Tablet,     09  Discontinued

 

 Alendronate Sodium 70 Mg Tab, 70 Mg Oral  Daily  09  Discontinued

 

 Levothyroxine Sodium 175 Mcg Tablet, 175 Mcg Oral  Bedtime  11  
Discontinued

 

 Lovastatin (Mevacor) 20 Mg Tablet, 40 Mg Oral  Bedtime  11  Discontinued

 

 Fish Oil 1,000 Mg Cap, 1000 Mg Oral  Three Times A Day  11  Discontinued

 

 Atenolol 50 Mg Tablet, 1 Each Oral  Daily  11  Discontinued

 

 Olmesartan 20 Mg Tablet, 40 Mg Oral  Daily  11  Discontinued

 

 Ca Cmb No.1/Vit D3/B-6/Fa/B12 1 Each Tablet, 1 Each Oral  Daily  11  
Discontinued

 

 Clopidogrel Bisulfate 75 Mg Tablet, 1 Each Oral  Daily  12  Discontinued

 

 Aspirin 81 Mg Tabec, 81 Mg Oral  Daily  12  Discontinued

 

 Aspirin 325 Mg Tab, 325 Mg Oral  Daily  12  Discontinued

 

 Naproxen Sodium 220 Mg Capsule, 220 Mg Oral  As Needed  12  Discontinued

 

 Vitamin E 400 Unit Capsule, 400 Unit Oral  Daily  12  Discontinued

 

 Cholecalciferol 1,000 Unit Tablet, 1000 Unit Oral  Daily  12  
Discontinued

 

 [Ibuprofen] ,  As Needed  12  Discontinued

 

 Cholecalciferol (Vitamin D3) 400 Unit Tablet, 400 Unit Oral  Daily  13  
Discontinued

 

 Levothyroxine Sodium (Levothroid) 150 Mcg Tablet, 150 Mcg Oral  Bedtime    Discontinued

 

 Krill Oil/Omega-3/Dha/Epa 1 Each Capsule, 1 Cap Oral  Twice A Day  13  
Discontinued

 

 Acetaminophen 325 Mg Tab, 650 Mg Oral  Every 6 Hours as needed  13  
Discontinued

 

 Enoxaparin Sodium 30 Mg/0.3 Ml Disp.syrin, 30 Mg Sub-Q  Give Every 12 Hrs On 
Schedule  13  Discontinued

 

 Losartan Potassium 25 Mg Tablet, 25 Mg Oral  Daily  13  Discontinued

 

 Ondansetron Hcl 4 Mg/2 Ml Soln, 4 Mg Intraven  Every 6 Hours as needed    Discontinued

 

 Oxycodone Hcl/Acetaminophen 1 Each Tablet, 1 Each Oral  Q2hrs as needed    Discontinued

 

 Senna 1 Ea Tablet, 2 Ea Oral  Twice A Day  13  Discontinued

 

 [Cathete] , 10 Ml Intraven  As Needed as needed  13  Discontinued

 

 [Cath] , 10 Ml Intraven  Every 8HRS  13  Discontinued

 

 Temazepam 15 Mg Capsule, 15 Mg Oral  Bedtime as needed  13  Discontinued

 

 Cholecalciferol (Vitamin D3) 400 Unit Tablet, 400 Unit Oral  Daily  13  
Discontinued

 

 [Saline Flush] , 10 Ml Intraven  Every 8HRS  13  Discontinued

 

 [Saline Flush] , 10 Ml Intraven  As Needed  13  Discontinued

 

 Oxycodone Hcl/Acetaminophen 1 Each Tablet, 1 Each Oral  Bedtime  13  
Discontinued

 

 Losartan Potassium 25 Mg Tablet, 25 Mg Oral  Daily  13  Discontinued

 

 Senna 1 Tab Tablet, 2 Tab Oral  Twice A Day as needed  13  Discontinued

 

 Ciprofloxacin 500 Mg Tablet, 1 Tab Oral  Twice A Day  13  Discontinued

 

 Vitamin E 400 Unit Capsule, 400 Unit Oral  Daily  09/10/13  Discontinued

 

 [Nicholas Red] , 1 Cap Oral  Bedtime  09/10/13  Discontinued

 

 Lovastatin (Mevacor) 40 Mg Tablet, 40 Mg Oral  Bedtime  09/10/13  Discontinued

 

 Atorvastatin Calcium 10 Mg Tablet, 10 Mg Oral  Bedtime  14  Discontinued

 

 Vitamin E (Dl,Tocopheryl Acet) 200 Unit Capsule, 200 Unit Oral  Daily    Discontinued

 

 Atorvastatin Calcium 20 Mg Tablet, 20 Mg Oral  Bedtime  14  Discontinued

 

 Famotidine (Pepcid) 20 Mg Tablet, 1 Each Oral  Daily  14  Discontinued

 

 Vitamin E (Dl,Tocopheryl Acet) 200 Unit Capsule, 200 Unit Oral  Daily  10/22/
15  Discontinued

 

 Omeprazole 20 Mg Capsule.dr, 20 Mg Oral  Daily  10/23/15  Discontinued







Social History







 Social History Problem  Response  Recorded Date/Time

 

 Alcohol Use  Denies Use  2016 2:05am

 

 Recreational Drug Use  No  2016 2:05am

 

 Recent Foreign Travel  No  2014 10:30am

 

 Recent Infectious Disease Exposure  No  2014 10:30am

 

 Hospitalization with Isolation  Denies  2014 10:30am

 

 Smoking Status  Current Everyday Smoker  2016 2:05am

 

 Do you dip or chew tobacco?  No  2016 9:21pm









 Query  Response  Start Date  Stop Date

 

 Smoking Status  Current Everyday Smoker     2015







Hospital Discharge Instructions

No hospital discharge instructions.



Plan of Care







 Discharge Date  16 2:10pm

 

 Disposition  01 HOME, SELF-CARE

 

 Instructions/Education Provided  Bowel Obstruction (DC)

 

 Forms Provided  [facility]

 

 Prescriptions  See Medication Section

 

 Referrals   (Unspecified) - 1 Week







Functional Status







 Query  Response  Date Recorded

 

 Patient Orientation  Person

Place

Time

Situation

  2016 2:57pm

 

 Comprehension Ability  Understands Concepts

  2016 2:05am







Allergies, Adverse Reactions, Alerts







 Allergen  Type  Severity  Reaction  Status  Last Updated

 

 Sulfa (Sulfonamide Antibiotics) ([id number])  Allergy  Unknown     Active  







Immunizations







 Name  Given  Type

 

 Date of Pneumonia Vaccine  10/01/13  Historical

 

 Date of Influenza Vaccine  10/01/13  Historical

 

 Tetanus Booster (TDap)  Unknown  Historical







Vital Signs

Acute Vital Signs





 Vital  Response  Date/Time

 

 Temperature (Fahrenheit)  98.8 degrees F (97.6 - 99.5)  2016 2:05pm

 

 Temperature (Calculated Celsius)  37.41480 degrees C (36.4 - 37.5)  2016 
10:00am

 

 Temperature Source  Tympanic  2016 2:05pm

 

 Pulse Rate (adult)  60 bpm (60 - 90)  2016 2:05pm

 

 Respiratory Rate  16 bpm (12 - 24)  2016 2:05pm

 

 O2 Sat by Pulse Oximetry  93 % (88 - 100)  2016 2:05pm

 

 Blood Pressure  131/62 mm Hg  2016 2:05pm

 

    Blood Pressure Mean  85 mm Hg  2016 10:00am

 

 Pain      

 

    Pain Intensity  0  2016 10:00am

 

 Height (Feet)  5 feet  2016 2:05am

 

 Height (Inches)  1.00 inches  2016 2:05am

 

 Height (Calculated Centimeters)  154.156315 cm  2016 2:05am

 

 Weight (Pounds)  142 pounds  2016 2:05am

 

 Weight (Ounces)  0.0 oz  2016 2:05am

 

 Weight (Calculated Grams)  87928.117 gm  2016 2:05am

 

 Weight (Calculated Kilograms)  64.233687 kilograms  2016 2:05am

 

 Calculated BMI  26.8  2016 2:05am







Results

Laboratory Results







 Test Name  Result  Units  Flags  Reference  Collection Date/Time  Result Date/
Time  Comments

 

 White Blood Count  8.1  10^3/uL     4.3-11.0  2016 5:17am  2016 5:
52am   

 

 Red Blood Count  3.53  10^6/uL  L  4.35-5.85  2016 5:2016 5:
52am   

 

 Hemoglobin  11.3  G/DL  L  11.5-16.0  2016 5:2016 5:52am   

 

 Hematocrit  35  %     35-52  2016 5:2016 5:52am   

 

 Mean Corpuscular Volume  98  FL     80-99  2016 5:2016 5:
52am   

 

 Mean Corpuscular Hemoglobin  32  PG     25-34  2016 5:17a2016 5:
52am   

 

 Mean Corpuscular Hemoglobin Concent  33  G/DL     32-36  2016 5:17a 5:52am   

 

 Red Cell Distribution Width  13.8  %     10.0-14.5  2016 5:17am  2016 5:52am   

 

 Platelet Count  133  10^3/uL     130-400  2016 5:2016 5:52am
   

 

 Mean Platelet Volume  11.0  FL  H  7.4-10.4  2016 5:2016 5:
52am   

 

 Neutrophils (%) (Auto)  59  %     42-75  2016 5:2016 5:52am 
  

 

 Lymphocytes (%) (Auto)  28  %     12-44  2016 5:2016 5:52am 
  

 

 Monocytes (%) (Auto)  8  %     0-12  2016 5:2016 5:52am   

 

 Eosinophils (%) (Auto)  5  %     0-10  2016 5:2016 5:52am   

 

 Basophils (%) (Auto)  0  %     0-10  2016 5:2016 5:52am   

 

 Neutrophils # (Auto)  4.8  X 10^3     1.8-7.8  2016 5:2016 5:
52am   

 

 Lymphocytes # (Auto)  2.3  X 10^3     1.0-4.0  2016 5:2016 5:
52am   

 

 Monocytes # (Auto)  0.6  X 10^3     0.0-1.0  2016 5:2016 5:
52am   

 

 Eosinophils # (Auto)  0.4  10^3/uL  H  0.0-0.3  2016 5:2016 5
:52am   

 

 Basophils # (Auto)  0.0  10^3/uL     0.0-0.1  2016 5:2016 5:
52am   

 

 Neutrophils % (Manual)  68  %        2016 9:20pm  2016 10:13pm   

 

 Band Neutrophils  1  %        2016 9:20pm  2016 10:13pm   

 

 Lymphocytes % (Manual)  28  %        2016 9:20pm  2016 10:13pm   

 

 Monocytes % (Manual)  1  %        2016 9:20pm  2016 10:13pm   

 

 Eosinophils % (Manual)  1  %        2016 9:20pm  2016 10:13pm   

 

 Basophils % (Manual)  1  %        2016 9:20pm  2016 10:13pm   

 

 Blood Morphology Comment  NORMAL           2016 9:20pm  2016 10:
13pm   

 

 Urine Color  YELLOW           2016 4:37am  2016 4:53am   

 

 Urine Clarity  VERY CLOUDY     *     2016 4:37am  2016 4:53am   

 

 Urine pH  6        5-9  2016 4:37am  2016 4:53am   

 

 Urine Specific Gravity  1.015     *  1.016-1.022  2016 4:37am  2016 4:53am   

 

 Urine Protein  2+     *  NEGATIVE  2016 4:37am  2016 4:53am   

 

 Urine Glucose (UA)  NEGATIVE        NEGATIVE  2016 4:37am  2016 4:
53am   

 

 Urine RBC (Auto)  NEGATIVE        NEGATIVE  2016 4:37am  2016 4:
53am   

 

 Urine Ketones  NEGATIVE        NEGATIVE  2016 4:37am  2016 4:53am 
  

 

 Urine Nitrite  NEGATIVE        NEGATIVE  2016 4:37am  2016 4:53am 
  

 

 Urine Bilirubin  NEGATIVE        NEGATIVE  2016 4:37am  2016 4:
53am   

 

 Urine Urobilinogen  1  MG/DL     NORMAL  2016 4:37am  2016 4:53am 
  

 

 Urine Leukocyte Esterase  2+     *  NEGATIVE  2016 4:37am  2016 4:
53am   

 

 Urine RBC  NONE  /HPF        2016 4:37am  2016 4:53am   

 

 Urine WBC  10-25  /HPF  *     2016 4:37am  2016 4:53am   

 

 Urine Bacteria  LARGE  /HPF  *     2016 4:37am  2016 4:53am   

 

 Urine Squamous Epithelial Cells  NONE  /HPF        2016 4:37am  2016 4:53am   

 

 Urine Crystals  NONE  /LPF        2016 4:37am  2016 4:53am   

 

 Urine Casts  NONE  /LPF        2016 4:37am  2016 4:53am   

 

 Urine Mucus  NEGATIVE  /LPF        2016 4:37am  2016 4:53am   

 

 Urine Culture Indicated  YES           2016 4:37am  2016 4:53am   

 

 Sodium Level  141  MMOL/L     135-145  2016 5:2016 6:12am   

 

 Potassium Level  4.3  MMOL/L     3.6-5.0  2016 5:2016 6:12am
   

 

 Chloride Level  108  MMOL/L  H    2016 5:2016 6:12am   

 

 Carbon Dioxide Level  24  MMOL/L     21-32  2016 5:2016 6:
12am   

 

 Anion Gap  9  MMOL/L     5-14  2016 5:2016 6:12am   

 

 Blood Urea Nitrogen  22  MG/DL  H  7-18  2016 5:2016 6:12am 
  

 

 Creatinine  1.14  MG/DL     0.60-1.30  2016 5:2016 6:12am   

 

 BUN/Creatinine Ratio  19           2016 5:2016 6:12am   

 

 Estimat Glomerular Filtration Rate  45           2016 5:2016 
6:12am                    GFR INTERPRETIVE DATA  

  

         UNITS FOR ESTIMATED GFR (eGFR): mL/min/1.73 M2  

         REFERENCE RANGE FOR ESTIMATED GFR (eGFR)  

                                          eGFR  

         NORMAL eGFR                       >60  

         MODERATELY DECREASED eGFR          30-59  

         SEVERLY DECREASED eGFR             15-29  

         KIDNEY FAILURE                    <15 (OR DIALYSIS)  

 

 Glucose Level  133  MG/DL  H    2016 5:2016 6:12am   

 

 Calcium Level  8.4  MG/DL  L  8.5-10.1  2016 5:2016 6:12am   

 

 Total Bilirubin  0.7  MG/DL     0.1-1.0  2016 5:2016 6:12am 
  

 

 Alkaline Phosphatase  45  U/L       2016 5:2016 6:12am
   

 

 Aspartate Amino Transf (AST/SGOT)  16  U/L     5-34  2016 5:2016 6:12am   

 

 Alanine Aminotransferase (ALT/SGPT)  12  U/L     0-55  2016 5:17am   6:12am   

 

 Total Protein  5.0  G/DL  L  6.4-8.2  2016 5:17am  2016 6:12am   

 

 Albumin  3.2  G/DL     3.2-4.5  2016 5:17am  2016 6:12am   

 

 Amylase Level  60  U/L       2016 9:20pm  2016 10:06pm   

 

 Lipase  11  U/L     8-78  2016 9:20pm  2016 10:06pm   





Microbiology Results







 Procedure  Source  Result  Collection Date/Time  Result Date/Time

 

 Urine Culture  Urine, Clean Catch  PROBABLE E.COLI  2016 4:37am  2016 7:44am







Procedures

No known history of procedures.



Encounters







 Encounter  Location  Arrival/Admit Date  Discharge/Depart Date  Attending 
Provider

 

 Discharged Inpatient  [facility]  16 1:29am   2:10pm  ROSE GUADARRAMA DO











 Recent Diagnosis

 

 Acute renal insufficiency

 

 Hypertension

 

 Hypothyroid

 

 Small bowel obstruction

 

 Small bowel obstruction due to adhesions

## 2017-03-07 NOTE — DIAGNOSTIC IMAGING REPORT
EXAM: Digital mammogram, bilateral screening.



The current study was also evaluated with a Computer Aided

Detection (CAD) system.



COMPARISON: 2/13/15, 12/6/13 and 10/24/12.



At this time, there are no current complaints.



FINDINGS: The fibroglandular tissue in both breasts is dense.

This does limit the sensitivity of this exam. Overall, there

does not appear to have been any significant change when

compared to the prior study. No primary or secondary sign of

malignancy is noted.



IMPRESSION:



There is no radiographic evidence for malignancy.



ACR BI-RADS Category 1: Negative.

Result letter will be mailed to the patient.

Note:  At least 10% of breast cancer is not imaged by

mammography.





Dictated by:



Dictated on workstation # [id number]

## 2017-03-29 ENCOUNTER — HOSPITAL ENCOUNTER (EMERGENCY)
Dept: HOSPITAL 75 - ER | Age: 82
Discharge: HOME | End: 2017-03-29
Payer: MEDICARE

## 2017-03-29 VITALS — SYSTOLIC BLOOD PRESSURE: 136 MMHG | DIASTOLIC BLOOD PRESSURE: 84 MMHG

## 2017-03-29 VITALS — HEIGHT: 61 IN | BODY MASS INDEX: 26.81 KG/M2 | WEIGHT: 142 LBS

## 2017-03-29 DIAGNOSIS — Z79.82: ICD-10-CM
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
## 2017-03-29 DIAGNOSIS — Z79.899: ICD-10-CM

## 2017-03-29 DIAGNOSIS — S42.351A: Primary | ICD-10-CM

## 2017-03-29 PROCEDURE — 99283 EMERGENCY DEPT VISIT LOW MDM: CPT

## 2017-03-29 PROCEDURE — 73030 X-RAY EXAM OF SHOULDER: CPT

## 2017-03-29 PROCEDURE — 73060 X-RAY EXAM OF HUMERUS: CPT

## 2017-03-29 NOTE — ED FALL/INJURY
General


Chief Complaint:  Upper Extremity


Stated Complaint:  FALL / R ARM


Nursing Triage Note:  


PT CO OF R SHOULDER PAIN FROM FALL, STATES KNEE GAVE OUT AND FELL AT [facility]


Source:  patient, spouse


Exam Limitations:  no limitations





History of Present Illness


Time seen by provider:  14:43


Initial Comments


80-year-old female patient presents to the emergency department complains of 

right shoulder pain after falling.  States her knee gave out and she fell onto 

the right shoulder while at [facility].  Denies hitting her head or loss of 

consciousness.  Denies neck or back pain.


Location Injury Occurred:  [facility]


Occurred:  this morning


Injuries/Pain Location:  upper extremity (rt shoulder)


Context:  other (knee gave out)


Loss of Consciousness:  no loss of consciousness


Modifying Factors:  Improves With Immobilization, Worse With Movement





Allergies and Home Medications


Allergies


Coded Allergies:  


     Sulfa (Sulfonamide Antibiotics) (Verified  Allergy, Unknown, 8/3/13)





Home Medications


Amlodipine Besylate 5 Mg Tablet, 5 MG PO DAILY, (Reported)


Aspirin 325 Mg Tab, 325 MG PO HS, (Reported)


Atenolol 50 Mg Tablet, 50 MG PO DAILY, (Reported)


Cholecalciferol (Vitamin D3) 400 Unit Tablet, 400 UNIT PO DAILY, (Reported)


Folic Acid/Mv,Fe,Other Min 1 Each Tablet, 1 TAB PO DAILY, (Reported)


Hydrocodone/Acetaminophen 1 Each Tablet, 1 EACH PO Q4H PRN for PAIN, #30 Ref 0


   Prescribed by: EVELIO DAVILA on 3/29/17 1500


Krill/Om-3/Dha/Epa/Phospho/Ast 1 Each Capsule, 1 CAP PO HS, (Reported)


Levothyroxine Sodium 75 Mcg Tablet, 75 MCG PO HS, (Reported)





Constitutional:  No dizziness, No weakness


Eyes:  No Symptoms Reported


Ears, Nose, Mouth, Throat:  no symptoms reported


Respiratory:  No cough, No short of breath


Cardiovascular:  No chest pain, No palpitations, No syncope


Gastrointestinal:  no symptoms reported


Genitourinary:  no symptoms reported


Musculoskeletal:  No back pain, joint pain (rt shoulder), joint swelling (rt 

shoulder), No neck pain


Skin:  no symptoms reported


Psychiatric/Neurological:  Denies Headache, Denies Numbness, Denies Paresthesia

, Denies Seizure, Denies Tingling, Denies Weakness


All Other Systems Reviewed


Negative Unless Noted:  Yes (Negative excepted noted.)





Past Medical-Social-Family Hx


Patient Social History


Alcohol Use:  Denies Use


Recreational Drug Use:  No


Smoking Status:  Never a Smoker


Former Smoker/When Quit:  Aug 19, 2015


2nd Hand Smoke Exposure:  No


Recent Foreign Travel:  No


Contact w/Someone Who Travel:  No


Recent Infectious Disease Expo:  No


Recent Hopitalizations:  No





Immunizations Up To Date


Tetanus Booster (TDap):  Unknown


PED Vaccines UTD:  No


Date of Pneumonia Vaccine:  Oct 1, 2013


Date of Influenza Vaccine:  Oct 1, 2013





Seasonal Allergies


Seasonal Allergies:  No





Surgeries


HX Surgeries:  Yes (RIGHT KNEE AND LEFT HIP REPLACEMENTS)


Surgeries:  Gallbladder, Hysterectomy, Joint Replacement, Orthopedic





Respiratory


Hx Respiratory Disorders:  Yes (AT AGE 9 )


Respiratory Disorders:  Pneumonia





Cardiovascular


Hx Cardiac Disorders:  Yes


Cardiac Disorders:  Heart Murmur, Hypertension, Valvular Heart Disease





Neurological


Hx Neurological Disorders:  No





Reproductive System


Hx Reproductive Disorders:  No





Genitourinary


Hx Genitourinary Disorders:  No


Genitourinary Disorders:  Bladder Infection





Gastrointestinal


Hx Gastrointestinal Disorders:  Yes


Gastrointestinal Disorders:  Obstructive Bowel, Chronic Constipation, Polyps





Musculoskeletal


Hx Musculoskeletal Disorders:  Yes (RIGH KNEE REPLACEMENT/LT HIP REPLACEMENT/ 

GENERALIZED ATHRITIS)


Musculoskeletal Disorders:  Arthritis





Endocrine


Hx Endocrine Disorders:  Yes


Endocrine Disorders:  Hypothyroidsim





HEENT


HX ENT Disorders:  Yes


HEENT Disorders:  Cataract


Hearing Impairment:  Hard of Hearing





Cancer


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





Psychosocial


Hx Psychiatric Problems:  Yes


Behavioral Health Disorders:  Depression





Integumentary


HX Skin/Integumentary Disorder:  No





Blood Transfusions


Hx Blood Disorders:  No


Adverse Reaction to a Blood Tr:  No





Reviewed Nursing Assessment


Reviewed/Agree w Nursing PMH:  Yes





Family Medical History


Significant Family History:  Heart Disease, Hypertension


Family Medial History:  


Myocardial infarction


  19 FATHER, Onset:86 (FATHER  OF UNKNOWN HEART PROBLEMS, PT STATES IT WAS 

PROBABLY A HEART ATTACK)


  19 MOTHER, Onset:70


  G8 BROTHER, Onset:46


  G8 SISTER





Physical Exam


Vital Signs


Capillary Refill : Less Than 3 Seconds





Progress/Results/Core Measures


Results/Orders


My Orders





Orders - EVELIO DAVILA


Hydrocodone/Apap 5/325 Tablet (Lortab 5 (3/29/17 15:26)


Padded Arm Sling Medium (3/29/17 15:26)





Vital Signs/I&O











Blood Pressure Mean:  119











Diagnostic Imaging





   Diagonstic Imaging:  Xray


   Plain Films/CT/US/NM/MRI:  other (rt shoulder)


Comments


FINDINGS: There is a comminuted fracture of the proximal humerus with a 

sclerotic nondisplaced fracture line running in a transverse plane through the 

anatomic neck of the humerus. There is no subluxation or dislocation. Mild 

degenerative changes at the acromioclavicular joint. IMPRESSION: Comminuted 

fracture of the proximal humerus with minimal displacement of the greater 

tuberosity laterally. Dictated by: Dictated on workstation # [id number]


   Reviewed:  Reviewed by Me (radiology report reviewed)








   Diagonstic Imaging:  Xray


   Plain Films/CT/US/NM/MRI:  other (rt humerus)


Comments


INDICATION: Fall with right humeral pain. AP and lateral views of the right 

humerus are obtained. FINDINGS: There is a comminuted fracture of the lateral 

portion of the humeral head and neck. Humeral shaft appears intact. There is no 

lytic or blastic lesion. IMPRESSION: Acute fracture of lateral portion of 

humeral head and neck without significant displacement. Dictated by: Dictated 

on workstation # [id number]


   Reviewed:  Reviewed by Me (radiology report reviewed.)





Departure


Communication


Progress Notes


Patient case discussed with Dr. Bass.  Recommends placing patient in a 

shoulder immobilizer or sling and having her follow-up in his office within the 

next 7 days for recheck and further management.  Diagnostic findings as well as 

recommendations by Dr. Bass discussed with the patient.  Patient voices 

understanding and agrees with the treatment plan.  Patient placed in a sling.  

All discharge instructions as well as return precautions were discussed with 

the patient.  Patient voices understanding.





Impression


Impression:  


 Primary Impression:  


 Proximal humeral fracture


 Qualified Codes:  S42.201A - Unspecified fracture of upper end of right humerus

, initial encounter for closed fracture


Disposition:  01 HOME, SELF-CARE


Condition:  Improved





Departure-Patient Inst.


Decision time for Depature:  14:58


Referrals:  


HERMELINDA BASS MD, JOHN D MD (PCP/Family)


Primary Care Physician


Patient Instructions:  Shoulder Fracture (DC)





Add. Discharge Instructions:  


All discharge instructions reviewed with patient and/or family. Voiced 

understanding.  Medications as instructed.  No ibuprofen or Aleve.  Ice pack 

for 20 minute intervals as needed for pain.  Arm sling as instructed.  Follow-

up with Dr. Bass as outpatient next 7 days, call his office for appointment 

time today.  Return to the emergency department for worsened pain, numbness, 

weakness, discoloration, neck pain, back pain, changes in behavior, slurred 

speech, changes in behavior, chest pain, shortness of air, vomiting, seizure, 

or any other concerns.


Scripts


Hydrocodone/Acetaminophen (Hydrocodon -Acetaminophen 5-325) 1 Each Tablet


1 EACH PO Q4H Y for PAIN, #30 TAB 0 Refills


   Prov: EVELIO DAVILA         3/29/17











EVELIO DAVILA Mar 29, 2017 15:02

## 2017-03-29 NOTE — DIAGNOSTIC IMAGING REPORT
3 views of the right shoulder.



INDICATION: Fall.



FINDINGS:

There is a comminuted fracture of the proximal humerus with a

sclerotic nondisplaced fracture line running in a transverse

plane through the anatomic neck of the humerus. There is no

subluxation or dislocation. Mild degenerative changes at the

acromioclavicular joint.



IMPRESSION: Comminuted fracture of the proximal humerus with

minimal displacement of the greater tuberosity laterally.



Dictated by:



Dictated on workstation # [id number]

## 2017-03-29 NOTE — DIAGNOSTIC IMAGING REPORT
INDICATION: Fall with right humeral pain.



AP and lateral views of the right humerus are obtained.



FINDINGS: There is a comminuted fracture of the lateral portion

of the humeral head and neck. Humeral shaft appears intact.

There is no lytic or blastic lesion.



IMPRESSION:



Acute fracture of lateral portion of humeral head and neck

without significant displacement.



Dictated by:



Dictated on workstation # [id number]

## 2017-04-23 NOTE — XMS REPORT
Continuity of Care Document

 Created on: 2017



RUPERT TELLO

External Reference #: [id number]

: 1928

Sex: Female



Demographics







 Address  [address] [place]  [address]

 

 Home Phone  [phone]

 

 Preferred Language  Unknown

 

 Marital Status  Unknown

 

 Yazidism Affiliation  Unknown

 

 Race  Unknown

 

 Ethnic Group  Unknown





Author







 Author  [facility]

 

 Organization  [facility]

 

 Address  Unknown

 

 Phone  Unavailable



                                                      



Allergies

                      





 Active                    Description                    Code                  
  Type                    Severity                    Reaction                  
  Onset                    Reported/Identified                    Relationship 
to Patient                    Clinical Status                

 

 Yes                    Sulfa (Sulfonamide Antibiotics)                    
[id number]                    Drug Allergy                    Unknown          
          N/A                                         2013               
                                           



                                                                               
         



Medications

                                                                               
         



Problems

                      





 Date Dx Coded                    Attending                    Type            
        Code                    Diagnosis                    Diagnosed By      
          

 

 2011                                         Ot                    211.3
                    BENIGN NEOPLASM LG BOWEL                                   
  

 

 2011                                         Ot                    244.9
                    HYPOTHYROIDISM NOS                                     

 

 2011                                         Ot                    
250.00                    DIAB ABIGAIL WO COMPL, TYPE II OR UNSPEC TY             
                        

 

 2011                                         Ot                    272.0
                    PURE HYPERCHOLESTEROLEM                                     

 

 2011                                         Ot                    401.9
                    HYPERTENSION NOS                                     

 

 2011                                         Ot                    
530.81                    ESOPHAGEAL REFLUX                                     

 

 2011                                         Ot                    553.3
                    DIAPHRAGMATIC HERNIA                                     

 

 2011                                         Ot                    
562.12                    DIVERTICULOSIS OF COLON WITH HEMORRHAGE              
                       

 

 2011                                         Ot                    578.1
                    BLOOD IN STOOL                                     

 

 2012                                         Ot                    
433.10                    CAROTID ARTERY OCCLUSION W O CEREBRAL IN             
                        

 

 2012                                         Ot                    435.2
                    SUBCLAVIAN STEAL SYNDROM                                   
  

 

 2012                                         Ot                    
V43.64                    HIP JOINT REPLACEMENT STATUS                         
            

 

 2012                                         Ot                    
V45.77                    ACQRD ABSENCE OF GENITAL ORGANS                      
               

 

 2012                                         Ot                    
V45.79                    ACQRD ABSENCE OF OTH ORGAN                           
          

 

 2012                                         Ot                    
V58.69                    OTH MED,LT,CURRENT USE                               
      

 

 2012                                         Ot                    244.9
                    HYPOTHYROIDISM NOS                                     

 

 2012                                         Ot                    
250.00                    DIAB ABIGAIL WO COMPL, TYPE II OR UNSPEC TY             
                        

 

 2012                                         Ot                    272.4
                    HYPERLIPIDEMIA NEC/NOS                                     

 

 2012                                         Ot                    
362.34                    TRANSIENT ARTERIAL OCCLU                             
        

 

 2012                                         Ot                    396.3
                    MITRAL/AORTIC RAHAT INSUFF                                   
  

 

 2012                                         Ot                    401.9
                    HYPERTENSION NOS                                     

 

 2012                                         Ot                    426.4
                    RT BUNDLE BRANCH BLOCK                                     

 

 2012                                         Ot                    
433.10                    CAROTID ARTERY OCCLUSION W O CEREBRAL IN             
                        

 

 2012                                         Ot                    440.0
                    AORTIC ATHEROSCLEROSIS                                     

 

 2012                                         Ot                    
V12.54                    PERSONAL HX OF TIA,  CEREBRAL INFARCTION             
                        

 

 2012                                         Ot                    
V58.66                    LONG-TERM (CURRENT) USE OF ASPIRIN                   
                  

 

 2012                                         Ot                    
V58.69                    OTH MED,LT,CURRENT USE                               
      

 

 2013                    NATA PERRY MD                    Ot      
              244.9                    HYPOTHYROIDISM NOS                      
               

 

 2013                    NATA PERRY MD                    Ot      
              250.00                    DIAB ABIGAIL WO COMPL, TYPE II OR UNSPEC 
TY                                     

 

 2013                    NATA PERRY MD                    Ot      
              272.4                    HYPERLIPIDEMIA NEC/NOS                  
                   

 

 2013                    NATA PERRY MD                    Ot      
              401.9                    HYPERTENSION NOS                        
             

 

 2013                    NATA PERRY MD                    Ot      
              530.81                    ESOPHAGEAL REFLUX                      
               

 

 2013                    NATA PERRY MD                    Ot      
              715.36                    LOC OSTEOARTH NOS-L/LEG                
                     

 

 2013                    LARY SHEPPARD, ROSE E                    Ot         
           244.9                    HYPOTHYROIDISM NOS                         
            

 

 2013                    LARY SHEPPARD, ROSE E                    Ot         
           250.00                    DIAB ABIGAIL WO COMPL, TYPE II OR UNSPEC TY  
                                   

 

 2013                    LARY SHEPPARD, ROSE E                    Ot         
           272.4                    HYPERLIPIDEMIA NEC/NOS                     
                

 

 2013                    LARY SHEPPARD, ROSE E                    Ot         
           285.9                    ANEMIA NOS                                 
    

 

 2013                    LARY SHEPPARD, ROSE E                    Ot         
           401.9                    HYPERTENSION NOS                           
          

 

 2013                    LARY SHEPPARD, ROSE E                    Ot         
           443.9                    PERIPH VASCULAR DIS NOS                    
                 

 

 2013                    LARY SHEPPARD, ROSE E                    Ot         
           530.81                    ESOPHAGEAL REFLUX                         
            

 

 2013                    LARY SHEPPARD, ROSE E                    Ot         
           716.90                    ARTHROPATHY NOS-UNSPEC                    
                 

 

 2013                    LARY SHEPPARD, ROSE E                    Ot         
           786.09                    RESPIRATORY ABNORM NEC                    
                 

 

 2013                    LARY SHEPPARD, ROSE E                    Ot         
           V43.65                    KNEE JOINT REPLACEMENT STATUS             
                        

 

 2013                    LARY SHEPPARD, ROSE E                    Ot         
           V54.81                    AFTERCARE FOLLOWING JOINT REPLACEMENT     
                                

 

 2013                    LARY SHEPPARD, ROSE E                    Ot         
           V57.89                    REHABILITATION PROC NEC                   
                  

 

 2013                                         Ot                    041.3
                    KLEBSIELLA PNEUMONIAE                                     

 

 2013                                         Ot                    
041.49                    OTHER AND UNSPECIFIED ESCHERICHIA COLI [             
                        

 

 2013                                         Ot                    
041.85                    BACTERIAL INFEC DUE TO OTH GRAM-NEG ORGA             
                        

 

 2013                                         Ot                    244.9
                    HYPOTHYROIDISM NOS                                     

 

 2013                                         Ot                    
250.00                    DIAB ABIGAIL WO COMPL, TYPE II OR UNSPEC TY             
                        

 

 2013                                         Ot                    272.4
                    HYPERLIPIDEMIA NEC/NOS                                     

 

 2013                                         Ot                    401.9
                    HYPERTENSION NOS                                     

 

 2013                                         Ot                    
414.01                    CORONARY ATHEROSCLEROSIS OF NATIVE CORON             
                        

 

 2013                                         Ot                    
590.10                    AC PYELONEPHRITIS NOS                                
     

 

 2013                                         Ot                    
V15.82                    HISTORY OF TOBACCO USE                               
      

 

 09/10/2013                    GAYLA DONOHUE DO                    Ot        
            211.3                    BENIGN NEOPLASM LG BOWEL                  
                   

 

 09/10/2013                    GAYLA DONOHUE DO                    Ot        
            578.1                    BLOOD IN STOOL                            
         

 

 01/15/2014                    CLAUDIA DIXON MD                    Ot          
          211.4                    BENIGN NEOPL RECTUM/ANUS                    
                 

 

 01/15/2014                    CLAUDIA DIXON MD                    Ot          
          455.0                    INT HEMORRHOID W/O COMPL                    
                 

 

 01/15/2014                    CLAUDIA DIXON MD                    Ot          
          455.3                    EXT HEMORRHOID W/O COMPL                    
                 

 

 01/15/2014                    CLAUDIA DIXON MD                    Ot          
          562.10                    DIVERTICULOSIS COLON (W/O MENT OF HEMORR   
                                  

 

 2014                    EDUARDO WILSON MD                    Ot      
              153.9                    MALIGNANT DEBBIE COLON NOS                 
                    

 

 2014                    EDUARDO WILSON MD                    Ot      
              244.9                    HYPOTHYROIDISM NOS                      
               

 

 2014                    EDUARDO WILSON MD                    Ot      
              250.00                    DIAB ABIGAIL WO COMPL, TYPE II OR UNSPEC 
TY                                     

 

 2014                    EDUARDO WILSON MD                    Ot      
              272.0                    PURE HYPERCHOLESTEROLEM                 
                    

 

 2014                    EDUARDO WILSON MD                    Ot      
              272.4                    HYPERLIPIDEMIA NEC/NOS                  
                   

 

 2014                    EDUARDO WILSON MD                    Ot      
              311                    DEPRESSIVE DISORDER NEC                   
                  

 

 2014                    EDUARDO WILSON MD                    Ot      
              338.29                    OTHER CHRONIC PAIN                     
                

 

 2014                    EDUARDO WILSON MD                    Ot      
              396.3                    MITRAL/AORTIC RAHAT INSUFF                
                     

 

 2014                    EDUARDO WILSON MD                    Ot      
              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                 
                    

 

 2014                    EDUARDO WILSON MD                    Ot      
              401.9                    HYPERTENSION NOS                        
             

 

 2014                    EDUARDO WILSON MD                    Ot      
              414.01                    CORONARY ATHEROSCLEROSIS OF NATIVE 
CORON                                     

 

 2014                    EDUARDO WILSON MD                    Ot      
              426.4                    RT BUNDLE BRANCH BLOCK                  
                   

 

 2014                    EDUARDO WILSON MD                    Ot      
              427.61                    ATRIAL PREMATURE BEATS                 
                    

 

 2014                    EDUARDO WILSON MD                    Ot      
              427.9                    CARDIAC DYSRHYTHMIA NOS                 
                    

 

 2014                    EDUARDO WILSON MD                    Ot      
              433.10                    CAROTID ARTERY OCCLUSION W O CEREBRAL 
IN                                     

 

 2014                    EDUARDO WILSON MD                    Ot      
              443.9                    PERIPH VASCULAR DIS NOS                 
                    

 

 2014                    KATIE SHEPPARD, EDUARDO PHELAN                    Ot      
              716.90                    ARTHROPATHY NOS-UNSPEC                 
                    

 

 2014                    EDUARDO WILSON MD                    Ot      
              719.41                    JOINT PAIN-SHLDER                      
               

 

 2014                    EDUARDO WILSON MD                    Ot      
              724.5                    BACKACHE NOS                            
         

 

 2014                    EDUARDO WILSON MD                    Ot      
              785.2                    CARDIAC MURMURS NEC                     
                

 

 2014                    EDUARDO WILSON MD                    Ot      
              786.59                    CHEST PAIN NEC                         
            

 

 2014                    EDUARDO WILSON MD                    Ot      
              V17.49                    FAMILY HISTORY OF OTHER CARDIOVASCULAR 
D                                     

 

 2014                    JASON CHACON MD                    Ot           
         153.3                    MAL DEBBIE SIGMOID COLON                        
             

 

 2014                    JASON CHACON MD                    Ot           
         244.9                    HYPOTHYROIDISM NOS                           
          

 

 2014                    INES SHEPPARD, JASON DENSON                    Ot           
         250.00                    DIAB ABIGAIL WO COMPL, TYPE II OR UNSPEC TY    
                                 

 

 2014                    JASON CHACON MD                    Ot           
         272.0                    PURE HYPERCHOLESTEROLEM                      
               

 

 2014                    JASON CHACON MD                    Ot           
         401.9                    HYPERTENSION NOS                             
        

 

 2014                    JASON CHACON MD                    Ot           
         414.00                    CORON ATHEROSCLER NOS TYPE VESSEL, NATIV    
                                 

 

 2014                    JASON CHACON MD                    Ot           
         153.3                    MAL DEBBIE SIGMOID COLON                        
             

 

 2014                    JASON CHACON MD                    Ot           
         244.9                    HYPOTHYROIDISM NOS                           
          

 

 2014                    INES SHEPPARD, JASON DENSON                    Ot           
         250.00                    DIAB ABIGAIL WO COMPL, TYPE II OR UNSPEC TY    
                                 

 

 2014                    JASON CHACON MD                    Ot           
         272.0                    PURE HYPERCHOLESTEROLEM                      
               

 

 2014                    JASON CHACON MD                    Ot           
         401.9                    HYPERTENSION NOS                             
        

 

 2014                    JASON CHACON MD                    Ot           
         414.00                    CORON ATHEROSCLER NOS TYPE VESSEL, NATIV    
                                 

 

 2014                    DRE WRIGHT                  
  Ot                    250.00                                                 
         

 

 2014                    DRE WRIGHT                  
  Ot                    272.0                                                  
        

 

 2014                    DRE WRIGHT                  
  Ot                    401.9                                                  
        

 

 2014                    DRE WRIGHT                  
  Ot                    434.91                                                 
         

 

 2014                    DRE WRIGHT                  
  Ot                    786.50                                                 
         

 

 2014                    JASON CHACON MD                    Ot           
         153.3                                                          

 

 2014                    JASON CHACON MD                    Ot           
         244.9                                                          

 

 2014                    JASON CHACON MD                    Ot           
         250.00                                                          

 

 2014                    JASON CHACON MD                    Ot           
         272.0                                                          

 

 2014                    INES SHEPPARD, JASON JARETT                    Ot           
         401.9                                                          

 

 2014                    INES SHEPPARD, JASON K                    Ot           
         414.00                                                          

 

 2014                    INES SHEPPARD, JASON K                    Ot           
         153.3                                                          

 

 2014                    INES SHEPPARD, JASON JARETT                    Ot           
         244.9                                                          

 

 2014                    INES SHEPPARD, JASON K                    Ot           
         250.00                                                          

 

 2014                    INES SHEPPARD, JASON K                    Ot           
         272.0                                                          

 

 2014                    INES SHEPPARD, JASON K                    Ot           
         401.9                                                          

 

 2014                    INES SHEPPARD, JASON K                    Ot           
         414.00                                                          

 

 2014                    INES SHEPPARD, JASON K                    Ot           
         153.3                                                          

 

 2014                    INES SHEPPARD, JASON JARETT                    Ot           
         244.9                                                          

 

 2014                    INES SHEPPARD, JASON K                    Ot           
         250.00                                                          

 

 2014                    INES SHEPPARD, JASON K                    Ot           
         272.0                                                          

 

 2014                    INES SHEPPARD, JASON K                    Ot           
         401.9                                                          

 

 2014                    INES SHEPPARD, JASON DENSON                    Ot           
         414.00                                                          

 

 2015                    INES SHEPPARD, JASON DENSON                    Ot           
         153.3                                                          

 

 2015                    INES SHEPPARD, JASON DENSON                    Ot           
         244.9                                                          

 

 2015                    INES SHEPPARD, JASON JARETT                    Ot           
         250.00                                                          

 

 2015                    INES SHEPPARD, JASON JARETT                    Ot           
         272.0                                                          

 

 2015                    INES SHEPPARD, JASON K                    Ot           
         401.9                                                          

 

 2015                    INES SHEPPARD, JASON DENSON                    Ot           
         414.00                                                          

 

 2015                    INES SHEPPARD, JASON DENSON                    Ot           
         V58.69                                                          

 

 2015                    INES SHEPPARD, JASON DENSON                    Ot           
         153.3                                                          

 

 2015                    INES SHEPPARD, JASON JARETT                    Ot           
         244.9                                                          

 

 2015                    INES SHEPPARD, JASON DENSON                    Ot           
         250.00                                                          

 

 2015                    INES SHEPPARD, JASON K                    Ot           
         272.0                                                          

 

 2015                    INES SHEPPARD, JASON K                    Ot           
         401.9                                                          

 

 2015                    INES SHEPPARD, JASON K                    Ot           
         414.00                                                          

 

 2015                    INES SHEPPARD, JASON K                    Ot           
         V58.69                                                          

 

 2015                    INES SHEPPARD, JASON K                    Ot           
         153.3                    MAL DEBBIE SIGMOID COLON                        
             

 

 2015                    INES SHEPPARD, JASON DENSON                    Ot           
         244.9                    HYPOTHYROIDISM NOS                           
          

 

 2015                    INES SHEPPARD, JASON DENSON                    Ot           
         250.00                    DIAB ABIGAIL WO COMPL, TYPE II OR UNSPEC TY    
                                 

 

 2015                    INES SHEPPARD, JASNO DENSON                    Ot           
         272.0                    PURE HYPERCHOLESTEROLEM                      
               

 

 2015                    INES SHEPPARD, JASON DENSON                    Ot           
         401.9                    HYPERTENSION NOS                             
        

 

 2015                    INES SHEPPARD, JASON K                    Ot           
         414.00                    CORON ATHEROSCLER NOS TYPE VESSEL, NATIV    
                                 

 

 2015                    INES SHEPPARD, JASON DENSON                    Ot           
         V58.69                    OT MED,LT,CURRENT USE                      
               

 

 2015                    INES SHEPPARD, JASON DENSON                    Ot           
         153.3                                                          

 

 2015                    INES SHEPPARD, JASON DENSON                    Ot           
         244.9                                                          

 

 2015                    INES SHEPPARD, JASON DENSON                    Ot           
         250.00                                                          

 

 2015                    INES SHEPPARD, JASON DENSON                    Ot           
         272.0                                                          

 

 2015                    INES SHEPPARD, JASON DENSON                    Ot           
         401.9                                                          

 

 2015                    INES SHEPPARD, JASON DENSON                    Ot           
         414.00                                                          

 

 2015                    INES SHEPPARD, JASON DENSON                    Ot           
         V58.69                                                          

 

 2015                    INES SHEPPARD, JASON DENSON                    Ot           
         153.3                                                          

 

 2015                    INES SHEPPARD, JASON DENSON                    Ot           
         244.9                                                          

 

 2015                    INES SHEPPARD, JASON DENSON                    Ot           
         250.00                                                          

 

 2015                    INES SHEPPARD, JASON K                    Ot           
         272.0                                                          

 

 2015                    INES SHEPPARD, JASON DENSON                    Ot           
         401.9                                                          

 

 2015                    INES SHEPPARD, JASON DESNON                    Ot           
         414.00                                                          

 

 2015                    INES SHEPPARD, JASON K                    Ot           
         V58.69                                                          

 

 2015                    INES SHEPPARD, JASON K                    Ot           
         153.3                                                          

 

 2015                    INES SHEPPARD, JASON DENSON                    Ot           
         244.9                                                          

 

 2015                    INES SHEPPARD, JASON K                    Ot           
         250.00                                                          

 

 2015                    INES SHEPPARD, JASON K                    Ot           
         272.0                                                          

 

 2015                    INES SHEPPARD, JASON K                    Ot           
         401.9                                                          

 

 2015                    INES SHEPPARD, JASON K                    Ot           
         414.00                                                          

 

 2015                    INES SHEPPARD, JASON K                    Ot           
         V58.69                                                          

 

 2015                    INES SHEPPARD, JASON DENSON                    Ot           
         153.3                                                          

 

 2015                    INES SHEPPARD, JASON K                    Ot           
         244.9                                                          

 

 2015                    INES SHEPPARD, JASON K                    Ot           
         250.00                                                          

 

 2015                    INES SHEPPARD, JASON K                    Ot           
         272.0                                                          

 

 2015                    INES SHEPPARD, JASON K                    Ot           
         401.9                                                          

 

 2015                    INES SHEPPARD, JASON K                    Ot           
         414.00                                                          

 

 2015                    INES SHEPPARD, JASON K                    Ot           
         V58.69                                                          

 

 2015                    INES SHEPPARD, JASON K                    Ot           
         153.3                                                          

 

 2015                    INES SHEPPARD, JASON DENSON                    Ot           
         244.9                                                          

 

 2015                    INES SHEPPARD, JASON K                    Ot           
         250.00                                                          

 

 2015                    INES SHEPPARD, JASON K                    Ot           
         272.0                                                          

 

 2015                    INES SHEPPARD, JASON K                    Ot           
         401.9                                                          

 

 2015                    INES SHEPPARD, JASON DENSON                    Ot           
         414.00                                                          

 

 2015                    INES SEHPPARD, JASON DENSON                    Ot           
         V58.69                                                          

 

 2015                    INES SHEPPARD, JASON DENSON                    Ot           
         153.3                                                          

 

 2015                    INES SHEPPARD, JASON DENSON                    Ot           
         244.9                                                          

 

 2015                    INES SHEPPARD, JASON DENSON                    Ot           
         250.00                                                          

 

 2015                    INES SHEPPARD, JASON DENSON                    Ot           
         272.0                                                          

 

 2015                    INES SHEPPARD, JASON DENSON                    Ot           
         401.9                                                          

 

 2015                    INES SHEPPARD, JASON DENSON                    Ot           
         414.00                                                          

 

 2015                    INES SHEPPARD, JASON DENSON                    Ot           
         V58.69                                                          

 

 2015                    INES SHEPPARD, JASON DENSON                    Ot           
         153.3                    MAL DEBBIE SIGMOID COLON                        
             

 

 2015                    INES SHEPPARD, JASON DENSON                    Ot           
         244.9                    HYPOTHYROIDISM NOS                           
          

 

 2015                    INES SHEPPARD, JASON DENSON                    Ot           
         250.00                    DIAB ABIGAIL WO COMPL, TYPE II OR UNSPEC TY    
                                 

 

 2015                    INES SHEPPARD, JASON DENSON                    Ot           
         272.0                    PURE HYPERCHOLESTEROLEM                      
               

 

 2015                    INES SHEPPARD, JASON DENSON                    Ot           
         401.9                    HYPERTENSION NOS                             
        

 

 2015                    INES SHEPPARD, JASON DENSON                    Ot           
         414.00                    CORON ATHEROSCLER NOS TYPE VESSEL, NATIV    
                                 

 

 2015                    INES SHEPPARD, JASON DENSON                    Ot           
         V58.69                    OTH MED,LT,CURRENT USE                      
               

 

 10/23/2015                    EDUARDO WILSON MD                    Ot      
              E03.9                    HYPOTHYROIDISM, UNSPECIFIED             
                        

 

 10/23/2015                    EDUARDO WILSON MD                    Ot      
              E11.9                    TYPE 2 DIABETES MELLITUS WITHOUT COMPLIC
                                     

 

 10/23/2015                    EDUARDO WILSON MD                    Ot      
              I10                    ESSENTIAL (PRIMARY) HYPERTENSION          
                           

 

 10/23/2015                    EDUARDO WILSNO MD                    Ot      
              R07.9                    CHEST PAIN, UNSPECIFIED                 
                    

 

 10/23/2015                    EDUARDO WILSON MD                    Ot      
              Z87.891                    PERSONAL HISTORY OF NICOTINE 
DEPENDENCE                                     

 

 2015                    INES SHEPPARD, JASON DENSON                    Ot           
         153.3                                                          

 

 2015                    INES SHEPPARD, JASON DENSON                    Ot           
         244.9                                                          

 

 2015                    INES SHEPPARD, JASON DENSON                    Ot           
         250.00                                                          

 

 2015                    INES SHEPPARD, JASON DENSON                    Ot           
         272.0                                                          

 

 2015                    INES SHEPPARD, JASON DENSON                    Ot           
         401.9                                                          

 

 2015                    INES SHEPPARD, JASON DENSON                    Ot           
         414.00                                                          

 

 2015                    INES SHEPPARD, JASON DENSON                    Ot           
         V58.69                                                          

 

 2015                    INES SHEPPARD, JASON DENSON                    Ot           
         153.3                                                          

 

 2015                    INES MD, JASON K                    Ot           
         244.9                                                          

 

 2015                    INES SHEPPARD, JASON DENSON                    Ot           
         250.00                                                          

 

 2015                    INES SHEPPARD, JASON DENSON                    Ot           
         272.0                                                          

 

 2015                    INES SHEPPARD, JASON K                    Ot           
         401.9                                                          

 

 2015                    INES SHEPPARD, JASON K                    Ot           
         414.00                                                          

 

 2015                    INES SHEPPARD, JASON K                    Ot           
         V58.69                                                          

 

 2016                    INES SHEPPARD, JASON DENSON                    Ot           
         C18.7                                                          

 

 2016                    INES SHEPPARD, JASON DENSON                    Ot           
         E03.9                                                          

 

 2016                    INES SHEPPARD, JASON K                    Ot           
         E11.9                                                          

 

 2016                    INES SHEPPARD, JASON K                    Ot           
         E78.0                                                          

 

 2016                    INES SHEPPARD, JASON K                    Ot           
         I10                                                          

 

 2016                    INES SHEPPARD, JASON DENSON                    Ot           
         I25.10                                                          

 

 2016                    INES SHEPPARD, JASON DENSON                    Ot           
         Z79.899                                                          

 

 2016                    INES SHEPPARD, JASON DENSON                    Ot           
         C18.7                                                          

 

 2016                    INES SHEPPARD, JASON DENSON                    Ot           
         E03.9                                                          

 

 2016                    INES SHEPPARD, JASON DENSON                    Ot           
         E11.9                                                          

 

 2016                    INES SHEPPARD, JASON DENSON                    Ot           
         E78.0                                                          

 

 2016                    INES SHEPPARD, JASON DENSON                    Ot           
         I10                                                          

 

 2016                    INES SHEPPARD, JASON DENSON                    Ot           
         I25.10                                                          

 

 2016                    INES SHEPPARD, JASON DENSON                    Ot           
         Z79.899                                                          

 

 2016                    INES SHEPPARD, JASON DENSON                    Ot           
         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          
                           

 

 2016                    INES SHEPPARD, JASON DENSON                    Ot           
         E03.9                    HYPOTHYROIDISM, UNSPECIFIED                  
                   

 

 2016                    INES SHEPPARD, JASON DENSON                    Ot           
         E11.9                    TYPE 2 DIABETES MELLITUS WITHOUT COMPLIC     
                                

 

 2016                    INES SHEPPARD, JASON DENSON                    Ot           
         E78.0                    PURE HYPERCHOLESTEROLEMIA                    
                 

 

 2016                    INES SHEPPARD, JASON DENSON                    Ot           
         I10                    ESSENTIAL (PRIMARY) HYPERTENSION               
                      

 

 2016                    INES SHEPPARD, JASON DENSON                    Ot           
         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     
                                 

 

 2016                    INES SHEPPARD, JASON DENSON                    Ot           
         Z79.899                    OTHER LONG TERM (CURRENT) DRUG THERAPY     
                                

 

 2016                    JASON CHACON MD                    Ot           
         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          
                           

 

 2016                    INES SHEPPARD, JASON DENSON                    Ot           
         E03.9                    HYPOTHYROIDISM, UNSPECIFIED                  
                   

 

 2016                    INES SHEPPARD, JASON DENSON                    Ot           
         E11.9                    TYPE 2 DIABETES MELLITUS WITHOUT COMPLIC     
                                

 

 2016                    INES SHEPPARD, JASON DENSON                    Ot           
         E78.0                    PURE HYPERCHOLESTEROLEMIA                    
                 

 

 2016                    JASON CHACON MD                    Ot           
         I10                    ESSENTIAL (PRIMARY) HYPERTENSION               
                      

 

 2016                    JASON CHACON MD                    Ot           
         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     
                                 

 

 2016                    INES SHEPPARD JASON JARETT                    Ot           
         Z79.899                    OTHER LONG TERM (CURRENT) DRUG THERAPY     
                                

 

 2016                    JASON CHACON MD                    Ot           
         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          
                           

 

 2016                    JASON CHACON MD                    Ot           
         E03.9                    HYPOTHYROIDISM, UNSPECIFIED                  
                   

 

 2016                    JASON CHACON MD                    Ot           
         E11.9                    TYPE 2 DIABETES MELLITUS WITHOUT COMPLIC     
                                

 

 2016                    JASON CHACON MD                    Ot           
         E78.0                    PURE HYPERCHOLESTEROLEMIA                    
                 

 

 2016                    JASON CHACON MD                    Ot           
         I10                    ESSENTIAL (PRIMARY) HYPERTENSION               
                      

 

 2016                    JASON CHACON MD                    Ot           
         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     
                                 

 

 2016                    INES SHEPPARD JASON JARETT                    Ot           
         Z79.899                    OTHER LONG TERM (CURRENT) DRUG THERAPY     
                                

 

 2016                                         Ot                    401.9
                    HYPERTENSION NOS                                     

 

 2016                                         Ot                    785.2
                    CARDIAC MURMURS NEC                                     

 

 2016                                         Ot                    
564.00                    UNSPEC CONSTIPATION                                  
   

 

 2016                                         Ot                    578.1
                    BLOOD IN STOOL                                     

 

 2016                                         Ot                    
787.91                    DIARRHEA                                     

 

 2016                                         Ot                    
789.00                    ABDOMINAL PAIN, UNSPECIFIED SITE                     
                

 

 2016                                         Ot                    368.9
                    VISUAL DISTURBANCE NOS                                     

 

 2016                                         Ot                    401.9
                    HYPERTENSION NOS                                     

 

 2016                                         Ot                    
433.30                    MULT BILTRAL ARTERY OCCLUSION WO CEREBRA             
                        

 

 2016                                         Ot                    785.9
                    CARDIOVAS SYS SYMP NEC                                     

 

 2016                                         Ot                    
786.50                    CHEST PAIN NOS                                     

 

 2016                    EDUARDO WILSON MD                    Ot      
              719.46                    JOINT PAIN-L/LEG                       
              

 

 2016                    EDUARDO WILSON MD                    Ot      
              729.5                    PAIN IN LIMB                            
         

 

 2016                    NATA PERRY MD                    Ot      
              715.36                    LOC OSTEOARTH NOS-L/LEG                
                     

 

 2016                    NATA PERRY MD                    Ot      
              780.79                    OTH MALAISE FATIGUE                    
                 

 

 2016                    NATA PERRY MD                    Ot      
              791.9                    ABN URINE FINDINGS NEC                  
                   

 

 2016                    NATA PERRY MD                    Ot      
              V72.63                    PRE-PROCEDURAL LABORATORY EXAMINATION  
                                   

 

 2016                    NATA PERRY MD                    Ot      
              V72.83                    EXAM PRE-OPERATIVE NEC                 
                    

 

 2016                    NATA PERRY MD                    Ot      
              V74.8                    SCREEN-BACTERIAL DIS NEC                
                     

 

 2016                    GAYLA DONOHUE DO                    Ot        
            V72.84                    EXAM PRE-OPERATIVE NOS                   
                  

 

 2016                    ORLANDO SHEPPARD, NATA MARIANO                    Ot      
              721.3                    LUMBOSACRAL SPONDYLOSIS                 
                    

 

 2016                    SHRUTI GRIFFIN MD                    Ot        
            272.0                    PURE HYPERCHOLESTEROLEM                   
                  

 

 2016                    SHRUTI GRIFFIN MD                    Ot        
            396.3                    MITRAL/AORTIC RAHAT INSUFF                  
                   

 

 2016                    SHRUTI GRIFFIN MD                    Ot        
            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                   
                  

 

 2016                    SHRUTI GRIFFIN MD                    Ot        
            401.9                    HYPERTENSION NOS                          
           

 

 2016                    SHRUTI GRIFFIN MD                    Ot        
            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 
                                    

 

 2016                    BRONWYN VALENZUELA                    Ot  
                  V76.12                    OTH SCREEN MAMMO-MALIGN NEOPLASM OF 
BEATRICE                                     

 

 2016                    CLAUDIA DIXON MD                    Ot          
          V72.84                    EXAM PRE-OPERATIVE NOS                     
                

 

 2016                    JASON CHACON MD                    Ot           
         153.9                    MALIGNANT DEBBIE COLON NOS                      
               

 

 2016                    CLAUDIA DIXON MD                    Ot          
          V72.84                    EXAM PRE-OPERATIVE NOS                     
                

 

 2016                    CLAUDIA DIXON MD                    Ot          
          244.9                    HYPOTHYROIDISM NOS                          
           

 

 2016                    CLAUDIA DIXON MD                    Ot          
          272.0                    PURE HYPERCHOLESTEROLEM                     
                

 

 2016                    CLAUDIA DIXON MD                    Ot          
          401.9                    HYPERTENSION NOS                            
         

 

 2016                    CLAUDIA DIXON MD                    Ot          
          443.9                    PERIPH VASCULAR DIS NOS                     
                

 

 2016                    CLAUDIA DIXON MD                    Ot          
          455.0                    INT HEMORRHOID W/O COMPL                    
                 

 

 2016                    CLAUDIA DIXON MD                    Ot          
          455.3                    EXT HEMORRHOID W/O COMPL                    
                 

 

 2016                    CLAUDIA DIXON MD                    Ot          
          V10.05                    HX OF COLONIC MALIGNANCY                   
                  

 

 2016                    CLAUDIA DIXON MD                    Ot          
          V12.72                    PERSONAL HISTORY OF COLONIC POLYPS         
                            

 

 2016                    CLAUDIA DIXON MD                    Ot          
          V15.82                    HISTORY OF TOBACCO USE                     
                

 

 2016                    DRE WRIGHT                  
  Ot                    250.00                    DIAB ABIGAIL WO COMPL, TYPE II 
OR UNSPEC TY                                     

 

 2016                    DRE WRIGHT                  
  Ot                    272.0                    PURE HYPERCHOLESTEROLEM       
                              

 

 2016                    DRE WRIGHT                  
  Ot                    401.9                    HYPERTENSION NOS              
                       

 

 2016                    DRE WRIGHT                  
  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 
CEREBRAL IN                                     

 

 2016                    DRE WRIGHT                  
  Ot                    786.50                    CHEST PAIN NOS               
                      

 

 2016                    EDUARDO WILSON MD                    Ot      
              786.6                    CHEST SWELLING/MASS/LUMP                
                     

 

 2016                    EDUARDO WILSON MD                    Ot      
              V76.12                    OTH SCREEN MAMMO-MALIGN NEOPLASM OF 
BEATRICE                                     

 

 2016                    JASON CHACON MD                    Ot           
         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          
                           

 

 2016                    JASON CHACON MD                    Ot           
         E03.9                    HYPOTHYROIDISM, UNSPECIFIED                  
                   

 

 2016                    JASON CHACON MD                    Ot           
         E11.9                    TYPE 2 DIABETES MELLITUS WITHOUT COMPLIC     
                                

 

 2016                    JASON CHACON MD                    Ot           
         E78.0                    PURE HYPERCHOLESTEROLEMIA                    
                 

 

 2016                    JASON CHACON MD                    Ot           
         I10                    ESSENTIAL (PRIMARY) HYPERTENSION               
                      

 

 2016                    JASON CHACON MD                    Ot           
         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     
                                 

 

 2016                    JASON CHACON MD                    Ot           
         Z79.899                    OTHER LONG TERM (CURRENT) DRUG THERAPY     
                                

 

 2016                    ROSE GUADARRAMA DO                    Ot         
           E03.9                    HYPOTHYROIDISM, UNSPECIFIED                
                     

 

 2016                    ROSE GUADARRAMA DO                    Ot         
           I10                    ESSENTIAL (PRIMARY) HYPERTENSION             
                        

 

 2016                    ROSE GUADARRAMA DO                    Ot         
           K21.9                    GASTRO-ESOPHAGEAL REFLUX DISEASE WITHOUT   
                                  

 

 2016                    ROSE GUADARRAMA DO                    Ot         
           K56.5                    INTESTINAL ADHESIONS W OBST (POSTPROCEDU   
                                  

 

 2016                    ROSE GUADARRAMA DO                    Ot         
           K59.00                    CONSTIPATION, UNSPECIFIED                 
                    

 

 2016                    ROSE GUADARRAMA DO                    Ot         
           N28.9                    DISORDER OF KIDNEY AND URETER, UNSPECIFI   
                                  

 

 2016                                         Ot                    401.9
                    HYPERTENSION NOS                                     

 

 2016                                         Ot                    785.2
                    CARDIAC MURMURS NEC                                     

 

 2016                                         Ot                    
564.00                    UNSPEC CONSTIPATION                                  
   

 

 2016                                         Ot                    578.1
                    BLOOD IN STOOL                                     

 

 2016                                         Ot                    
787.91                    DIARRHEA                                     

 

 2016                                         Ot                    
789.00                    ABDOMINAL PAIN, UNSPECIFIED SITE                     
                

 

 2016                                         Ot                    368.9
                    VISUAL DISTURBANCE NOS                                     

 

 2016                                         Ot                    401.9
                    HYPERTENSION NOS                                     

 

 2016                                         Ot                    
433.30                    MULT BILTRAL ARTERY OCCLUSION WO CEREBRA             
                        

 

 2016                                         Ot                    785.9
                    CARDIOVAS SYS SYMP NEC                                     

 

 2016                                         Ot                    
786.50                    CHEST PAIN NOS                                     

 

 2016                    KATIE SHEPPARD, EDUARDO PHELAN                    Ot      
              719.46                    JOINT PAIN-L/LEG                       
              

 

 2016                    EDUARDO WILSON MD                    Ot      
              729.5                    PAIN IN LIMB                            
         

 

 2016                    NATA PERRY MD                    Ot      
              715.36                    LOC OSTEOARTH NOS-L/LEG                
                     

 

 2016                    NATA PERRY MD                    Ot      
              780.79                    OTH MALAISE FATIGUE                    
                 

 

 2016                    NATA PERRY MD                    Ot      
              791.9                    ABN URINE FINDINGS NEC                  
                   

 

 2016                    NATA PERRY MD                    Ot      
              V72.63                    PRE-PROCEDURAL LABORATORY EXAMINATION  
                                   

 

 2016                    NATA PERRY MD                    Ot      
              V72.83                    EXAM PRE-OPERATIVE NEC                 
                    

 

 2016                    NATA PERRY MD                    Ot      
              V74.8                    SCREEN-BACTERIAL DIS NEC                
                     

 

 2016                    GAYLA DONOHUE DO                    Ot        
            V72.84                    EXAM PRE-OPERATIVE NOS                   
                  

 

 2016                    NATA PERRY MD                    Ot      
              721.3                    LUMBOSACRAL SPONDYLOSIS                 
                    

 

 2016                    SHRUTI GRIFFIN MD                    Ot        
            272.0                    PURE HYPERCHOLESTEROLEM                   
                  

 

 2016                    SHRUTI GRIFFIN MD                    Ot        
            396.3                    MITRAL/AORTIC RAHAT INSUFF                  
                   

 

 2016                    SHRUTI GRIFFIN MD                    Ot        
            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                   
                  

 

 2016                    SHRUTI GRIFFIN MD                    Ot        
            401.9                    HYPERTENSION NOS                          
           

 

 2016                    SHRUTI GRIFFIN MD                    Ot        
            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 
                                    

 

 2016                    BRONWYN VALENZUELA                    Ot  
                  V76.12                    OTH SCREEN MAMMO-MALIGN NEOPLASM OF 
BEATRICE                                     

 

 2016                    CLAUDIA DIXON MD                    Ot          
          V72.84                    EXAM PRE-OPERATIVE NOS                     
                

 

 2016                    JASON CHACON MD                    Ot           
         153.9                    MALIGNANT DEBBIE COLON NOS                      
               

 

 2016                    CLAUDIA DIXON MD                    Ot          
          V72.84                    EXAM PRE-OPERATIVE NOS                     
                

 

 2016                    CLAUDIA DIXON MD                    Ot          
          244.9                    HYPOTHYROIDISM NOS                          
           

 

 2016                    CLAUDIA DIXON MD                    Ot          
          272.0                    PURE HYPERCHOLESTEROLEM                     
                

 

 2016                    CLAUDIA DIXON MD                    Ot          
          401.9                    HYPERTENSION NOS                            
         

 

 2016                    CLAUDIA DIXON MD                    Ot          
          443.9                    PERIPH VASCULAR DIS NOS                     
                

 

 2016                    CLAUDIA DXION MD                    Ot          
          455.0                    INT HEMORRHOID W/O COMPL                    
                 

 

 2016                    CLAUDIA DIXON MD                    Ot          
          455.3                    EXT HEMORRHOID W/O COMPL                    
                 

 

 2016                    CLAUDIA DIXON MD                    Ot          
          V10.05                    HX OF COLONIC MALIGNANCY                   
                  

 

 2016                    CLAUDIA DIXON MD                    Ot          
          V12.72                    PERSONAL HISTORY OF COLONIC POLYPS         
                            

 

 2016                    CLAUDIA DIXON MD, Ot          
          V15.82                    HISTORY OF TOBACCO USE                     
                

 

 2016                    DRE WRIGHT                  
  Ot                    250.00                    DIAB ABIGAIL WO COMPL, TYPE II 
OR UNSPEC TY                                     

 

 2016                    DRE WRIGHT                  
  Ot                    272.0                    PURE HYPERCHOLESTEROLEM       
                              

 

 2016                    DRE WRIGHT                  
  Ot                    401.9                    HYPERTENSION NOS              
                       

 

 2016                    DRE WRIGHT                  
  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 
CEREBRAL IN                                     

 

 2016                    DRE WRIGHT                  
  Ot                    786.50                    CHEST PAIN NOS               
                      

 

 2016                    EDUARDO WILSON MD                    Ot      
              786.6                    CHEST SWELLING/MASS/LUMP                
                     

 

 2016                    EDUARDO WILSON MD                    Ot      
              V76.12                    OTH SCREEN MAMMO-MALIGN NEOPLASM OF 
BEATRICE                                     

 

 2016                    JASON CHACON MD                    Ot           
         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          
                           

 

 2016                    JASON CHACON MD                    Ot           
         E03.9                    HYPOTHYROIDISM, UNSPECIFIED                  
                   

 

 2016                    JASON CHACON MD                    Ot           
         E11.9                    TYPE 2 DIABETES MELLITUS WITHOUT COMPLIC     
                                

 

 2016                    JASON CHACON MD                    Ot           
         E78.0                    PURE HYPERCHOLESTEROLEMIA                    
                 

 

 2016                    JASON CHACON MD                    Ot           
         I10                    ESSENTIAL (PRIMARY) HYPERTENSION               
                      

 

 2016                    JASON CHACON MD                    Ot           
         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     
                                 

 

 2016                    JASON CHACON MD                    Ot           
         Z79.899                    OTHER LONG TERM (CURRENT) DRUG THERAPY     
                                

 

 2016                    JASON CHACON MD                    Ot           
         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          
                           

 

 2016                    JASON CHACON MD                    Ot           
         E03.9                    HYPOTHYROIDISM, UNSPECIFIED                  
                   

 

 2016                    JASON CHACON MD                    Ot           
         E11.9                    TYPE 2 DIABETES MELLITUS WITHOUT COMPLIC     
                                

 

 2016                    JASON CHACON MD                    Ot           
         E78.0                    PURE HYPERCHOLESTEROLEMIA                    
                 

 

 2016                    JASON CHACON MD                    Ot           
         I10                    ESSENTIAL (PRIMARY) HYPERTENSION               
                      

 

 2016                    JASON CHACON MD                    Ot           
         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     
                                 

 

 2016                    JASON CHACON MD                    Ot           
         Z79.899                    OTHER LONG TERM (CURRENT) DRUG THERAPY     
                                

 

 2016                                         Ot                    401.9
                    HYPERTENSION NOS                                     

 

 2016                                         Ot                    785.2
                    CARDIAC MURMURS NEC                                     

 

 2016                                         Ot                    
564.00                    UNSPEC CONSTIPATION                                  
   

 

 2016                                         Ot                    578.1
                    BLOOD IN STOOL                                     

 

 2016                                         Ot                    
787.91                    DIARRHEA                                     

 

 2016                                         Ot                    
789.00                    ABDOMINAL PAIN, UNSPECIFIED SITE                     
                

 

 2016                                         Ot                    368.9
                    VISUAL DISTURBANCE NOS                                     

 

 2016                                         Ot                    401.9
                    HYPERTENSION NOS                                     

 

 2016                                         Ot                    
433.30                    MULT BILTRAL ARTERY OCCLUSION WO CEREBRA             
                        

 

 2016                                         Ot                    785.9
                    CARDIOVAS SYS SYMP NEC                                     

 

 2016                                         Ot                    
786.50                    CHEST PAIN NOS                                     

 

 2016                    EDUARDO WILSON MD                    Ot      
              719.46                    JOINT PAIN-L/LEG                       
              

 

 2016                    EDUARDO WILSON MD                    Ot      
              729.5                    PAIN IN LIMB                            
         

 

 2016                    NATA PERRY MD                    Ot      
              715.36                    LOC OSTEOARTH NOS-L/LEG                
                     

 

 2016                    NATA PERRY MD                    Ot      
              780.79                    OTH MALAISE FATIGUE                    
                 

 

 2016                    NATA PERRY MD                    Ot      
              791.9                    ABN URINE FINDINGS NEC                  
                   

 

 2016                    NATA PERRY MD                    Ot      
              V72.63                    PRE-PROCEDURAL LABORATORY EXAMINATION  
                                   

 

 2016                    NATA PERRY MD                    Ot      
              V72.83                    EXAM PRE-OPERATIVE NEC                 
                    

 

 2016                    NATA PERRY MD                    Ot      
              V74.8                    SCREEN-BACTERIAL DIS NEC                
                     

 

 2016                    GAYLA DONOHUE DO                    Ot        
            V72.84                    EXAM PRE-OPERATIVE NOS                   
                  

 

 2016                    NATA PERRY MD                    Ot      
              721.3                    LUMBOSACRAL SPONDYLOSIS                 
                    

 

 2016                    SHRUTI GIRFFIN MD                    Ot        
            272.0                    PURE HYPERCHOLESTEROLEM                   
                  

 

 2016                    SHRUTI GRIFFIN MD                    Ot        
            396.3                    MITRAL/AORTIC RAHAT INSUFF                  
                   

 

 2016                    SHRUTI GRIFFIN MD                    Ot        
            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                   
                  

 

 2016                    SHRUTI GRIFFIN MD                    Ot        
            401.9                    HYPERTENSION NOS                          
           

 

 2016                    SHRUTI GRIFFIN MD                    Ot        
            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 
                                    

 

 2016                    BIANCABRONWYN ARNP                    Ot  
                  V76.12                    OTH SCREEN MAMMO-MALIGN NEOPLASM OF 
BEATRICE                                     

 

 2016                    CLAUDIA DIXON MD                    Ot          
          V72.84                    EXAM PRE-OPERATIVE NOS                     
                

 

 2016                    JASON CHACON MD                    Ot           
         153.9                    MALIGNANT DEBBIE COLON NOS                      
               

 

 2016                    CLAUDIA DIXON MD                    Ot          
          V72.84                    EXAM PRE-OPERATIVE NOS                     
                

 

 2016                    CLAUDIA DIXON MD                    Ot          
          244.9                    HYPOTHYROIDISM NOS                          
           

 

 2016                    CLAUDIA DIXON MD                    Ot          
          272.0                    PURE HYPERCHOLESTEROLEM                     
                

 

 2016                    CLAUDIA DIXON MD                    Ot          
          401.9                    HYPERTENSION NOS                            
         

 

 2016                    CLAUDIA DIXON MD                    Ot          
          443.9                    PERIPH VASCULAR DIS NOS                     
                

 

 2016                    CLAUDIA DIXON MD                    Ot          
          455.0                    INT HEMORRHOID W/O COMPL                    
                 

 

 2016                    CLAUDIA DIXON MD                    Ot          
          455.3                    EXT HEMORRHOID W/O COMPL                    
                 

 

 2016                    CLAUDIA DIXON MD                    Ot          
          V10.05                    HX OF COLONIC MALIGNANCY                   
                  

 

 2016                    CLAUDIA DIXON MD                    Ot          
          V12.72                    PERSONAL HISTORY OF COLONIC POLYPS         
                            

 

 2016                    CLAUDIA DIXON MD                    Ot          
          V15.82                    HISTORY OF TOBACCO USE                     
                

 

 2016                    DRE WRIGHT                  
  Ot                    250.00                    DIAB ABIGAIL WO COMPL, TYPE II 
OR UNSPEC TY                                     

 

 2016                    DRE WRIGHT                  
  Ot                    272.0                    PURE HYPERCHOLESTEROLEM       
                              

 

 2016                    DRE WRIGHT                  
  Ot                    401.9                    HYPERTENSION NOS              
                       

 

 2016                    DRE WRIGHT                  
  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 
CEREBRAL IN                                     

 

 2016                    DRE WRIGHT                  
  Ot                    786.50                    CHEST PAIN NOS               
                      

 

 2016                    EDUARDO WILSON MD                    Ot      
              786.6                    CHEST SWELLING/MASS/LUMP                
                     

 

 2016                    EDUARDO WILSON MD, Ot      
              V76.12                    OTH SCREEN MAMMO-MALIGN NEOPLASM OF 
BEATRICE                                     

 

 2016                    JASON CHACON MD                    Ot           
         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          
                           

 

 2016                    JASON CHACON MD                    Ot           
         E03.9                    HYPOTHYROIDISM, UNSPECIFIED                  
                   

 

 2016                    JASON CHACON MD                    Ot           
         E11.9                    TYPE 2 DIABETES MELLITUS WITHOUT COMPLIC     
                                

 

 2016                    JASON CHACON MD                    Ot           
         E78.0                    PURE HYPERCHOLESTEROLEMIA                    
                 

 

 2016                    JASON CHACON MD                    Ot           
         I10                    ESSENTIAL (PRIMARY) HYPERTENSION               
                      

 

 2016                    JASON CHACON MD                    Ot           
         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     
                                 

 

 2016                    JASON CHACON MD                    Ot           
         Z79.899                    OTHER LONG TERM (CURRENT) DRUG THERAPY     
                                

 

 2016                    BRONWYN VALENZUELA                    Ot  
                  M25.511                    PAIN IN RIGHT SHOULDER            
                         

 

 2016                    EDUARDO WILSON MD, Ot      
              M25.511                    PAIN IN RIGHT SHOULDER                
                     

 

 2016                    JASON CHACON MD                    Ot           
         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          
                           

 

 2016                    JASON CHACON MD                    Ot           
         E03.9                    HYPOTHYROIDISM, UNSPECIFIED                  
                   

 

 2016                    JASON CHACON MD                    Ot           
         E11.9                    TYPE 2 DIABETES MELLITUS WITHOUT COMPLIC     
                                

 

 2016                    JASON CHACON MD                    Ot           
         E78.0                    PURE HYPERCHOLESTEROLEMIA                    
                 

 

 2016                    JASON CHACON MD                    Ot           
         I10                    ESSENTIAL (PRIMARY) HYPERTENSION               
                      

 

 2016                    JASON CHACON MD                    Ot           
         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     
                                 

 

 2016                    JASON CHACON MD                    Ot           
         Z79.899                    OTHER LONG TERM (CURRENT) DRUG THERAPY     
                                

 

 2016                    BRONWYN VALENZUELA                    Ot  
                  M25.511                    PAIN IN RIGHT SHOULDER            
                         

 

 2016                    EDUARDO WILSON MD, Ot      
              M25.511                    PAIN IN RIGHT SHOULDER                
                     

 

 2016                    BRONWYN VALENZUELA                    Ot  
                  M25.511                    PAIN IN RIGHT SHOULDER            
                         

 

 2016                    JSAON CHACON MD                    Ot           
         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          
                           

 

 2016                    JASON CHACON MD                    Ot           
         E03.9                    HYPOTHYROIDISM, UNSPECIFIED                  
                   

 

 2016                    JASON CHACON MD                    Ot           
         E11.9                    TYPE 2 DIABETES MELLITUS WITHOUT COMPLIC     
                                

 

 2016                    JASON CHACON MD                    Ot           
         E78.0                    PURE HYPERCHOLESTEROLEMIA                    
                 

 

 2016                    JASON CHACON MD                    Ot           
         I10                    ESSENTIAL (PRIMARY) HYPERTENSION               
                      

 

 2016                    INES SHEPPARD, JASON DENSON                    Ot           
         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     
                                 

 

 2016                    INES SHEPPARD, JASON DENSON                    Ot           
         Z79.899                    OTHER LONG TERM (CURRENT) DRUG THERAPY     
                                

 

 2017                                         Ot                    
564.00                    UNSPEC CONSTIPATION                                  
   

 

 2017                                         Ot                    578.1
                    BLOOD IN STOOL                                     

 

 2017                                         Ot                    
787.91                    DIARRHEA                                     

 

 2017                                         Ot                    
789.00                    ABDOMINAL PAIN, UNSPECIFIED SITE                     
                

 

 2017                                         Ot                    368.9
                    VISUAL DISTURBANCE NOS                                     

 

 2017                                         Ot                    401.9
                    HYPERTENSION NOS                                     

 

 2017                                         Ot                    
433.30                    MULT BILTRAL ARTERY OCCLUSION WO CEREBRA             
                        

 

 2017                                         Ot                    785.9
                    CARDIOVAS SYS SYMP NEC                                     

 

 2017                                         Ot                    
786.50                    CHEST PAIN NOS                                     

 

 2017                    KATIE SHEPPARD, EDUARDO PHELAN                    Ot      
              719.46                    JOINT PAIN-L/LEG                       
              

 

 2017                    EDUARDO WILSON MD                    Ot      
              729.5                    PAIN IN LIMB                            
         

 

 2017                    NATA PERRY MD                    Ot      
              715.36                    LOC OSTEOARTH NOS-L/LEG                
                     

 

 2017                    NATA PERRY MD                    Ot      
              780.79                    OTH MALAISE FATIGUE                    
                 

 

 2017                    NATA PERRY MD                    Ot      
              791.9                    ABN URINE FINDINGS NEC                  
                   

 

 2017                    NATA PERRY MD                    Ot      
              V72.63                    PRE-PROCEDURAL LABORATORY EXAMINATION  
                                   

 

 2017                    NATA PERRY MD                    Ot      
              V72.83                    EXAM PRE-OPERATIVE NEC                 
                    

 

 2017                    NATA PERRY MD                    Ot      
              V74.8                    SCREEN-BACTERIAL DIS NEC                
                     

 

 2017                    GAYLA DONOHUE DO                    Ot        
            V72.84                    EXAM PRE-OPERATIVE NOS                   
                  

 

 2017                    NAAT PERRY MD                    Ot      
              721.3                    LUMBOSACRAL SPONDYLOSIS                 
                    

 

 2017                    SHRUTI GRIFFIN MD                    Ot        
            272.0                    PURE HYPERCHOLESTEROLEM                   
                  

 

 2017                    SHRUTI GRIFFIN MD                    Ot        
            396.3                    MITRAL/AORTIC RAHAT INSUFF                  
                   

 

 2017                    SHRUTI GRIFFIN MD                    Ot        
            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                   
                  

 

 2017                    SHRUTI GRIFFIN MD                    Ot        
            401.9                    HYPERTENSION NOS                          
           

 

 2017                    SHRUTI GRIFFIN MD                    Ot        
            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 
                                    

 

 2017                    BRONWYN VALENZUELAP                    Ot  
                  V76.12                    OTH SCREEN MAMMO-MALIGN NEOPLASM OF 
BEARTICE                                     

 

 2017                    CLAUDIA DIXON MD                    Ot          
          V72.84                    EXAM PRE-OPERATIVE NOS                     
                

 

 2017                    JASON CHACON MD                    Ot           
         153.9                    MALIGNANT DEBBIE COLON NOS                      
               

 

 2017                    CLAUDIA DIXON MD, Ot          
          V72.84                    EXAM PRE-OPERATIVE NOS                     
                

 

 2017                    CLAUDIA DIXON MD                    Ot          
          244.9                    HYPOTHYROIDISM NOS                          
           

 

 2017                    CLAUDIA DIXON MD                    Ot          
          272.0                    PURE HYPERCHOLESTEROLEM                     
                

 

 2017                    CLAUDIA DIXON MD                    Ot          
          401.9                    HYPERTENSION NOS                            
         

 

 2017                    CLAUDIA DIXON MD                    Ot          
          443.9                    PERIPH VASCULAR DIS NOS                     
                

 

 2017                    CLAUDIA DIXON MD                    Ot          
          455.0                    INT HEMORRHOID W/O COMPL                    
                 

 

 2017                    CLAUDIA DIXON MD                    Ot          
          455.3                    EXT HEMORRHOID W/O COMPL                    
                 

 

 2017                    CLAUDIA DIXON MD                    Ot          
          V10.05                    HX OF COLONIC MALIGNANCY                   
                  

 

 2017                    CLAUDIA DIXON MD                    Ot          
          V12.72                    PERSONAL HISTORY OF COLONIC POLYPS         
                            

 

 2017                    CLAUDIA DIXON MD                    Ot          
          V15.82                    HISTORY OF TOBACCO USE                     
                

 

 2017                    DRE WRIGHT                  
  Ot                    250.00                    DIAB ABIGAIL WO COMPL, TYPE II 
OR UNSPEC TY                                     

 

 2017                    DRE WRIGHT                  
  Ot                    272.0                    PURE HYPERCHOLESTEROLEM       
                              

 

 2017                    DRE WRIGHT                  
  Ot                    401.9                    HYPERTENSION NOS              
                       

 

 2017                    DRE WRIGHT                  
  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 
CEREBRAL IN                                     

 

 2017                    DRE WRIGHT                  
  Ot                    786.50                    CHEST PAIN NOS               
                      

 

 2017                    EDUARDO WILSON MD                    Ot      
              786.6                    CHEST SWELLING/MASS/LUMP                
                     

 

 2017                    EDUARDO WILSON MD                    Ot      
              V76.12                    OTH SCREEN MAMMO-MALIGN NEOPLASM OF 
BEATRICE                                     

 

 2017                    BRONWYN VALENZUELA                    Ot  
                  M25.511                    PAIN IN RIGHT SHOULDER            
                         

 

 2017                    JASON CHACON MD                    Ot           
         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          
                           

 

 2017                    JASON CHACON MD                    Ot           
         E03.9                    HYPOTHYROIDISM, UNSPECIFIED                  
                   

 

 2017                    JASON CHACON MD                    Ot           
         E11.9                    TYPE 2 DIABETES MELLITUS WITHOUT COMPLIC     
                                

 

 2017                    JASON CHACON MD                    Ot           
         E78.0                    PURE HYPERCHOLESTEROLEMIA * DO NOT USE *     
                                

 

 2017                    JASON CHACON MD                    Ot           
         I10                    ESSENTIAL (PRIMARY) HYPERTENSION               
                      

 

 2017                    JASON CHACON MD                    Ot           
         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     
                                 

 

 2017                    JASON CHACON MD                    Ot           
         Z79.899                    OTHER LONG TERM (CURRENT) DRUG THERAPY     
                                

 

 2017                    JASON CHACON MD                    Ot           
         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          
                           

 

 2017                    JASON CHACON MD                    Ot           
         E03.9                    HYPOTHYROIDISM, UNSPECIFIED                  
                   

 

 2017                    JASON CHACON MD                    Ot           
         E11.9                    TYPE 2 DIABETES MELLITUS WITHOUT COMPLIC     
                                

 

 2017                    JASON CHACON MD                    Ot           
         E78.0                    PURE HYPERCHOLESTEROLEMIA * DO NOT USE *     
                                

 

 2017                    JASON CHACON MD                    Ot           
         I10                    ESSENTIAL (PRIMARY) HYPERTENSION               
                      

 

 2017                    JASON CHACON MD                    Ot           
         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     
                                 

 

 2017                    JASON CHACON MD                    Ot           
         Z79.899                    OTHER LONG TERM (CURRENT) DRUG THERAPY     
                                

 

 2017                                         Ot                    
564.00                    UNSPEC CONSTIPATION                                  
   

 

 2017                                         Ot                    578.1
                    BLOOD IN STOOL                                     

 

 2017                                         Ot                    
787.91                    DIARRHEA                                     

 

 2017                                         Ot                    
789.00                    ABDOMINAL PAIN, UNSPECIFIED SITE                     
                

 

 2017                                         Ot                    368.9
                    VISUAL DISTURBANCE NOS                                     

 

 2017                                         Ot                    401.9
                    HYPERTENSION NOS                                     

 

 2017                                         Ot                    
433.30                    MULT BILTRAL ARTERY OCCLUSION WO CEREBRA             
                        

 

 2017                                         Ot                    785.9
                    CARDIOVAS SYS SYMP NEC                                     

 

 2017                                         Ot                    
786.50                    CHEST PAIN NOS                                     

 

 2017                    EDUARDO WILSON MD                    Ot      
              719.46                    JOINT PAIN-L/LEG                       
              

 

 2017                    EDUARDO WILSON MD                    Ot      
              729.5                    PAIN IN LIMB                            
         

 

 2017                    NATA PERRY MD                    Ot      
              715.36                    LOC OSTEOARTH NOS-L/LEG                
                     

 

 2017                    NATA PERRY MD                    Ot      
              780.79                    OTH MALAISE FATIGUE                    
                 

 

 2017                    NATA PERRY MD                    Ot      
              791.9                    ABN URINE FINDINGS NEC                  
                   

 

 2017                    NATA PERRY MD                    Ot      
              V72.63                    PRE-PROCEDURAL LABORATORY EXAMINATION  
                                   

 

 2017                    NATA PERRY MD                    Ot      
              V72.83                    EXAM PRE-OPERATIVE NEC                 
                    

 

 2017                    NATA PERRY MD                    Ot      
              V74.8                    SCREEN-BACTERIAL DIS NEC                
                     

 

 2017                    GAYLA DONOHUE DO                    Ot        
            V72.84                    EXAM PRE-OPERATIVE NOS                   
                  

 

 2017                    NATA PERRY MD                    Ot      
              721.3                    LUMBOSACRAL SPONDYLOSIS                 
                    

 

 2017                    SHRUTI GRIFFIN MD                    Ot        
            272.0                    PURE HYPERCHOLESTEROLEM                   
                  

 

 2017                    SHRUTI GRIFFIN MD                    Ot        
            396.3                    MITRAL/AORTIC RAHAT INSUFF                  
                   

 

 2017                    SHRUTI GRIFFIN MD                    Ot        
            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                   
                  

 

 2017                    SHRUTI GRIFFIN MD                    Ot        
            401.9                    HYPERTENSION NOS                          
           

 

 2017                    SHRUTI GRIFFIN MD                    Ot        
            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 
                                    

 

 2017                    BRONWYN VALENZUELA                    Ot  
                  V76.12                    OTH SCREEN MAMMO-MALIGN NEOPLASM OF 
BEATRICE                                     

 

 2017                    CLAUDIA DIXON MD                    Ot          
          V72.84                    EXAM PRE-OPERATIVE NOS                     
                

 

 2017                    JASON CHACON MD                    Ot           
         153.9                    MALIGNANT DEBBIE COLON NOS                      
               

 

 2017                    CLAUDIA DIXON MD, Ot          
          V72.84                    EXAM PRE-OPERATIVE NOS                     
                

 

 2017                    CLAUDIA DIXON MD                    Ot          
          244.9                    HYPOTHYROIDISM NOS                          
           

 

 2017                    CLAUDIA DIXON MD                    Ot          
          272.0                    PURE HYPERCHOLESTEROLEM                     
                

 

 2017                    CLAUDIA DIXON MD                    Ot          
          401.9                    HYPERTENSION NOS                            
         

 

 2017                    CLAUDIA DIXON MD                    Ot          
          443.9                    PERIPH VASCULAR DIS NOS                     
                

 

 2017                    CLAUDIA DIXON MD                    Ot          
          455.0                    INT HEMORRHOID W/O COMPL                    
                 

 

 2017                    CLAUDIA DIXON MD                    Ot          
          455.3                    EXT HEMORRHOID W/O COMPL                    
                 

 

 2017                    CLAUDIA DIXON MD                    Ot          
          V10.05                    HX OF COLONIC MALIGNANCY                   
                  

 

 2017                    CLAUDIA DIXON MD                    Ot          
          V12.72                    PERSONAL HISTORY OF COLONIC POLYPS         
                            

 

 2017                    CLAUDIA DIXON MD                    Ot          
          V15.82                    HISTORY OF TOBACCO USE                     
                

 

 2017                    DRE WRIGHT                  
  Ot                    250.00                    DIAB ABIGAIL WO COMPL, TYPE II 
OR UNSPEC TY                                     

 

 2017                    DRE WRIGHT                  
  Ot                    272.0                    PURE HYPERCHOLESTEROLEM       
                              

 

 2017                    DRE WRIGHT                  
  Ot                    401.9                    HYPERTENSION NOS              
                       

 

 2017                    DRE WRIGHT                  
  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 
CEREBRAL IN                                     

 

 2017                    DRE WRIGHT                  
  Ot                    786.50                    CHEST PAIN NOS               
                      

 

 2017                    EDUARDO WILSON MD                    Ot      
              786.6                    CHEST SWELLING/MASS/LUMP                
                     

 

 2017                    EDUARDO WILSON MD                    Ot      
              V76.12                    OTH SCREEN MAMMO-MALIGN NEOPLASM OF 
BEATRICE                                     

 

 2017                    BRONWYN VALENZUELA                    Ot  
                  M25.511                    PAIN IN RIGHT SHOULDER            
                         

 

 2017                    JASON CHACON MD                    Ot           
         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          
                           

 

 2017                    INES SHEPPARD, JASON DENSON                    Ot           
         E03.9                    HYPOTHYROIDISM, UNSPECIFIED                  
                   

 

 2017                    JASON CHACON MD                    Ot           
         E11.9                    TYPE 2 DIABETES MELLITUS WITHOUT COMPLIC     
                                

 

 2017                    INES SHEPPARD, JASON DENSON                    Ot           
         E78.00                    PURE HYPERCHOLESTEROLEMIA, UNSPECIFIED      
                               

 

 2017                    INES SHEPPARD, JASON DENSON                    Ot           
         I10                    ESSENTIAL (PRIMARY) HYPERTENSION               
                      

 

 2017                    INES SHEPPARD, JASON DENSON                    Ot           
         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     
                                 

 

 2017                    INES SHEPPARD, JASON DENSON                    Ot           
         Z79.899                    OTHER LONG TERM (CURRENT) DRUG THERAPY     
                                

 

 2017                    BRONWYN VALENZUELA ARNP                    Ot  
                  R05                    COUGH                                 
    

 

 2017                    BRONWYN VALENZUELAP                    Ot  
                  R07.81                    PLEURODYNIA                        
             

 

 2017                    BRONWYN VALENZUELA ARNP                    Ot  
                  Z91.81                    HISTORY OF FALLING                 
                    

 

 2017                    BRONWYN VALENZUELA ARNP                    Ot  
                  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  
                                   

 

 2017                    BRONWYN VALENZUELA ARNP                    Ot  
                  R05                    COUGH                                 
    

 

 2017                    BRONWYN VALENZUELA ARNP                    Ot  
                  R07.81                    PLEURODYNIA                        
             

 

 2017                    BRONWYN VALENZUELA ARNP                    Ot  
                  Z91.81                    HISTORY OF FALLING                 
                    

 

 2017                    BRONWYN VALENZUELA ARNP                    Ot  
                  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  
                                   

 

 2017                    BRONWYN VALENZUELA ARNP                    Ot  
                  R05                    COUGH                                 
    

 

 2017                    GOGO VALENZUELAIE M ROSANNA                    Ot  
                  R07.81                    PLEURODYNIA                        
             

 

 2017                    BIANCA BRONWYN BELLE ARNGARCÍA                    Ot  
                  Z91.81                    HISTORY OF FALLING                 
                    

 

 2017                    BRONWYN VALENZUELA                    Ot  
                  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  
                                   

 

 2017                    JASON CHACON MD                    Ot           
         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          
                           

 

 2017                    JASON CHACON MD                    Ot           
         E03.9                    HYPOTHYROIDISM, UNSPECIFIED                  
                   

 

 2017                    JASON CHACON MD                    Ot           
         E11.9                    TYPE 2 DIABETES MELLITUS WITHOUT COMPLIC     
                                

 

 2017                    JASON CHACON MD                    Ot           
         E78.00                    PURE HYPERCHOLESTEROLEMIA, UNSPECIFIED      
                               

 

 2017                    JASON CHACON MD                    Ot           
         I10                    ESSENTIAL (PRIMARY) HYPERTENSION               
                      

 

 2017                    JASON CHACON MD, Ot           
         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     
                                 

 

 2017                    JASON CHACON MD, Ot           
         Z79.899                    OTHER LONG TERM (CURRENT) DRUG THERAPY     
                                

 

 2017                    YAZMIN CHAIREZ MD, Ot         
           Z12.31                    ENCNTR SCREEN MAMMOGRAM FOR MALIGNANT NE  
                                   

 

 2017                    YAZMIN CHAIREZ MD, Ot         
           Z12.31                    ENCNTR SCREEN MAMMOGRAM FOR MALIGNANT NE  
                                   

 

 2017                    YAZIMN CHAIREZ MD, Ot         
           Z12.31                    ENCNTR SCREEN MAMMOGRAM FOR MALIGNANT NE  
                                   



                                                                               
                                                                               
                                                                               
                                                                               
                                                                               
                                                                               
                                                                               
                                                                               
                                                                               
                                                                               
                                                                               
                                                                               
                                                                               
                                                                               
                                                                               
                                                                               
                                                                               
                                                                               
                                                                               
                                                                               
                                                                               
                                                                               
                                                                               
                                                                               
                                                                               
                                                                               
                                                                               
                                                                               
                                                                               
                                                                               
                                                                               
                                                                               
                                                                               
                                                                               
                                                                               
                                                                               
                                                                               
                                                                               
                                                                               
                                                                               
                                                                               
                                                                               
                                                                               
                                                                               
                                                                               
                                                                               
                                                                               
                                                                               
                                                                               
                                                                               
                                                                               
                                                                               
                                                                               
                                                                               
                                                                               
                                                                               
                                                                               
                                                                               
                                                                               
                                                                               
                                                                               
                                                                               
                                                                               
                                                                               
                                                                               
                                                                               
                                                                               
                                                                               
                                                                               
                                                                               
                                                                               
                                                                               
                                                  



Procedures

                      





 Code                    Description                    Performed By           
         Performed On                

 

                     45.16                                                     
     ESOPHAGOGASTRODUODENOSCOPY [EGD] W/CLOSE                                  
                         2011                

 

                     45.42                                                     
     ENDOSC POLYPECTOMY OF LG INTEST                                           
                2011                

 

                     81.54                                                     
     TOTAL KNEE REPLACEMENT                                                    
       2013                



                                                                               
                             



Results

                                                                    



Encounters

                      





 ACCT No.                    Visit Date/Time                    Discharge      
              Status                    Pt. Type                    Provider   
                 Facility                    Loc./Unit                    
Complaint                

 

 [id number]                    2017 13:59:00                    2017 15:47:00                    DIS                    Emergency              
      EVELIO BARTHOLOMEW                    [facility]
                    ER                    FALL / R ARM                

 

 [id number]                    2016 12:33:00                    2016 00:01:00                    DIS                    Outpatient             
       JASON CHACON MD                    [facility]     
               ONC                                     

 

 [id number]                    07/15/2016 13:09:00                    2016 15:10:00                    DIS                    Outpatient             
       EDUARDO WILSON MD                    [facility]
                    REHAB                    R SHOULDER PAIN                

 

 [id number]                    2016 01:29:00                    06/20/
2016 14:10:00                    DIS                    Inpatient              
      ROSE GUADARRAMA DO                    [facility]    
                4TH                    SMALL BOWEL OBSTRUCTION DUE TO ADHESIONS
                

 

 [id number]                    2015 12:48:00                    2015 23:59:59                    CLS                    Outpatient             
       JASON CHACON MD                    [facility]     
               ONC                                     

 

 [id number]                    10/22/2015 10:10:00                    10/23/
2015 12:30:00                    DIS                    Inpatient              
      EDUARDO WILSON MD                    [facility] 
                   CSD                    CHEST PAIN                

 

 [id number]                    2015 12:51:00                    2015 00:01:00                    DIS                    Outpatient             
       JASON CHACON MD                    [facility]     
               ONC                                     

 

 [id number]                    2015 10:04:00                    2015 23:59:59                    CLS                    Outpatient             
       EDUARDO WILSON MD                    [facility]
                    RAD                    SCREENING,L CHEST WALL NODULE       
         

 

 [id number]                    2014 13:34:00                    2014 23:59:59                    CLS                    Outpatient             
       JASON CHACON MD                    [facility]     
               ONC                                     

 

 [id number]                    10/15/2014 07:58:00                    10/15/
2014 23:59:59                    CLS                    Outpatient             
       DRE WRIGHT                    [facility]                    CARD                    CVA,CP,HTN,NIDDM          
      

 

 [id number]                    10/01/2014 08:25:00                    10/01/
2014 23:59:59                    CLS                    Outpatient             
       CLAUDIA DIXON MD                    [facility]    
                SDC                    HX POLYPS                

 

 [id number]                    2014 07:36:00                    2014 23:59:59                    CLS                    Outpatient             
       CLAUDIA DIXON MD                    [facility]    
                PREOP                    HX POLYPS                

 

 [id number]                    2014 14:29:00                    2014 00:01:00                    DIS                    Outpatient             
       JASON CHACON MD                    [facility]     
               ONC                                     

 

 [id number]                    2014 09:24:00                    2014 23:59:59                    CLS                    Outpatient             
       JASON CHACON MD                    [facility]     
               RAD                    HX OF COLON CA                

 

 [id number]                    2014 14:06:00                    2014 00:01:00                    DIS                    Outpatient             
       INES SHEPPARD, JASON JARETT                    [facility]     
               ONC                                     

 

 [id number]                    2014 10:04:00                    2014 19:35:00                    DIS                    Inpatient              
      KATIE SHEPPARD, EDUARDO PHELAN                    [facility] 
                   CSD                    CHEST PAIN                

 

 [id number]                    01/15/2014 08:08:00                    01/15/
2014 12:10:00                    DIS                    Outpatient             
       CLAUDIA DIXON MD                    [facility]    
                SDC                    HISTORY OF POLYPS                

 

 [id number]                    2014 07:17:00                    2014 23:59:59                    CLS                    Outpatient             
       CLAUDIA DIXON MD                    [facility]    
                PREOP                    HISTORY OF POLYPS                

 

 [id number]                    2013 12:29:00                    2013 23:59:59                    CLS                    Outpatient             
       SHRUTI GRIFFIN MD                    [facility]  
                  CARD                    CVA,HTN,HYPOTHYROIDISM                

 

 [id number]                    2013 12:37:00                    2013 23:59:59                    CLS                    Outpatient             
       BRONWYN VALENZUELA                    [facility]                    RAD                    SCREENING                

 

 [id number]                    2013 13:39:00                    2013 23:59:59                    CLS                    Outpatient             
       NATA PERRY MD                    [facility]
                    RAD                    RT RADICULOPATHY                

 

 [id number]                    09/10/2013 10:46:00                    09/10/
2013 16:15:00                    DIS                    Outpatient             
       GAYLA DONOHUE DO                    [facility]  
                  SDC                    BLOOD IN STOOLS                

 

 [id number]                    2013 07:11:00                    2013 23:59:59                    CLS                    Outpatient             
       GAYLA DONOHUE DO                    [facility]  
                  PREOP                    BLOOD IN STOOLS                

 

 [id number]                    2013 11:44:00                    2013 11:50:00                    DIS                    Inpatient              
      ROSE BARTH MD                    [facility]    
                IRF                    POST OP KNEE REPLACEMENT                

 

 [id number]                    2013 08:46:00                    2013 11:43:00                    DIS                    Inpatient              
      NATA PERRY MD                    [facility] 
                   SURGICAL                    SEVERE ARTHRITIS RIGHT KNEE     
           

 

 [id number]                    2013 08:14:00                    06/05/
2013 23:59:59                    CLS                    Outpatient             
       ORLANDO SHEPPARD, NATA MARIANO                    [facility]
                    PREOP                    RT KNEE SEVERE OSTEOARTHRITIS     
           

 

 [id number]                    2013 15:53:00                    2013 23:59:59                    CLS                    Outpatient             
       KATIE SHEPPARD, EDUARDO PHELAN                    [facility]
                    RAD                    KNEE PAIN,LEG PAIN                

 

 [id number]                    2017 10:00:00                           
              ACT                    Outpatient                    CONTRERAS SHEPPARD, 
YAZMIN ODONNELL                    [facility]                    
RAD                    SCREENING                

 

 [id number]                    2017 11:17:00                           
              ACT                    Outpatient                    BRONWYN VALENZUELAP                    [facility]           
         RAD                    COUGH,FALL,RT RIB PAIN                

 

 [id number]                    2017 12:58:00                           
              ACT                    Outpatient                    INES SHEPPARD, 
JASON DENSON                    [facility]                    
ONC                                     

 

 [id number]                    2016 08:21:00                           
              ACT                    Outpatient                    BRONWYN VALENZUELAP                    [facility]           
         RAD                    R SHOULDER PAIN                

 

 [id number]                    2016 20:30:00                           
                                   Document Registration                       
                                                                             

 

 [id number]                    2016 20:30:00                           
                                   Document Registration                       
                                                                             

 

 [id number]                    2016 20:30:00                           
                                   Document Registration                       
                                                                             

 

 [id number]                    2013 10:44:00                           
                                   Document Registration                       
                                                                             

 

 [id number]                    2012 09:48:00                           
                                   Document Registration                       
                                                                             

 

 [id number]                    2012 10:38:00                           
                                   Document Registration                       
                                                                             

 

 [id number]                    2012 08:06:00                           
                                   Document Registration                       
                                                                             

 

 [id number]                    2012 13:45:00                           
                                   Document Registration                       
                                                                             

 

 [id number]                    2011 13:18:00                           
                                   Document Registration                       
                                                                             

 

 [id number]                    2011 08:44:00                           
                                   Document Registration                       
                                                                             

 

 [id number]                    2011 12:34:00                           
                                   Document Registration

## 2018-07-13 ENCOUNTER — HOSPITAL ENCOUNTER (INPATIENT)
Dept: HOSPITAL 75 - ER | Age: 83
LOS: 3 days | Discharge: HOME HEALTH | DRG: 536 | End: 2018-07-16
Attending: FAMILY MEDICINE | Admitting: FAMILY MEDICINE
Payer: MEDICARE

## 2018-07-13 VITALS — DIASTOLIC BLOOD PRESSURE: 85 MMHG | SYSTOLIC BLOOD PRESSURE: 120 MMHG

## 2018-07-13 VITALS — DIASTOLIC BLOOD PRESSURE: 67 MMHG | SYSTOLIC BLOOD PRESSURE: 147 MMHG

## 2018-07-13 VITALS — SYSTOLIC BLOOD PRESSURE: 133 MMHG | DIASTOLIC BLOOD PRESSURE: 63 MMHG

## 2018-07-13 VITALS — WEIGHT: 113.44 LBS | HEIGHT: 61 IN | BODY MASS INDEX: 21.42 KG/M2

## 2018-07-13 VITALS — SYSTOLIC BLOOD PRESSURE: 139 MMHG | DIASTOLIC BLOOD PRESSURE: 66 MMHG

## 2018-07-13 VITALS — DIASTOLIC BLOOD PRESSURE: 75 MMHG | SYSTOLIC BLOOD PRESSURE: 141 MMHG

## 2018-07-13 VITALS — DIASTOLIC BLOOD PRESSURE: 65 MMHG | SYSTOLIC BLOOD PRESSURE: 144 MMHG

## 2018-07-13 DIAGNOSIS — F17.200: ICD-10-CM

## 2018-07-13 DIAGNOSIS — M54.2: ICD-10-CM
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
## 2018-07-13 DIAGNOSIS — E03.9: ICD-10-CM

## 2018-07-13 DIAGNOSIS — F32.9: ICD-10-CM

## 2018-07-13 DIAGNOSIS — Z96.642: ICD-10-CM

## 2018-07-13 DIAGNOSIS — Z96.651: ICD-10-CM

## 2018-07-13 DIAGNOSIS — R01.1: ICD-10-CM

## 2018-07-13 DIAGNOSIS — S09.90XA: ICD-10-CM

## 2018-07-13 DIAGNOSIS — R07.9: ICD-10-CM

## 2018-07-13 DIAGNOSIS — R53.81: ICD-10-CM

## 2018-07-13 DIAGNOSIS — K59.09: ICD-10-CM
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
## 2018-07-13 DIAGNOSIS — M15.9: ICD-10-CM

## 2018-07-13 DIAGNOSIS — S32.301A: Primary | ICD-10-CM

## 2018-07-13 DIAGNOSIS — I10: ICD-10-CM

## 2018-07-13 LAB
ALBUMIN SERPL-MCNC: 4.2 GM/DL (ref 3.2–4.5)
ALP SERPL-CCNC: 73 U/L (ref 40–136)
ALT SERPL-CCNC: 20 U/L (ref 0–55)
APTT PPP: YELLOW S
BACTERIA #/AREA URNS HPF: (no result) /HPF
BASOPHILS # BLD AUTO: 0.1 10^3/UL (ref 0–0.1)
BASOPHILS NFR BLD AUTO: 1 % (ref 0–10)
BILIRUB SERPL-MCNC: 1 MG/DL (ref 0.1–1)
BILIRUB UR QL STRIP: NEGATIVE
BUN/CREAT SERPL: 20
CALCIUM SERPL-MCNC: 9.2 MG/DL (ref 8.5–10.1)
CHLORIDE SERPL-SCNC: 107 MMOL/L (ref 98–107)
CO2 SERPL-SCNC: 19 MMOL/L (ref 21–32)
CREAT SERPL-MCNC: 0.94 MG/DL (ref 0.6–1.3)
EOSINOPHIL # BLD AUTO: 0.5 10^3/UL (ref 0–0.3)
EOSINOPHIL NFR BLD AUTO: 4 % (ref 0–10)
ERYTHROCYTE [DISTWIDTH] IN BLOOD BY AUTOMATED COUNT: 14.1 % (ref 10–14.5)
FIBRINOGEN PPP-MCNC: CLEAR MG/DL
GFR SERPLBLD BASED ON 1.73 SQ M-ARVRAT: 56 ML/MIN
GLUCOSE SERPL-MCNC: 145 MG/DL (ref 70–105)
GLUCOSE UR STRIP-MCNC: NEGATIVE MG/DL
HCT VFR BLD CALC: 40 % (ref 35–52)
HGB BLD-MCNC: 13.5 G/DL (ref 11.5–16)
KETONES UR QL STRIP: NEGATIVE
LEUKOCYTE ESTERASE UR QL STRIP: NEGATIVE
LYMPHOCYTES # BLD AUTO: 1.5 X 10^3 (ref 1–4)
LYMPHOCYTES NFR BLD AUTO: 13 % (ref 12–44)
MANUAL DIFFERENTIAL PERFORMED BLD QL: NO
MCH RBC QN AUTO: 33 PG (ref 25–34)
MCHC RBC AUTO-ENTMCNC: 34 G/DL (ref 32–36)
MCV RBC AUTO: 96 FL (ref 80–99)
MONOCYTES # BLD AUTO: 0.8 X 10^3 (ref 0–1)
MONOCYTES NFR BLD AUTO: 7 % (ref 0–12)
NEUTROPHILS # BLD AUTO: 8.8 X 10^3 (ref 1.8–7.8)
NEUTROPHILS NFR BLD AUTO: 76 % (ref 42–75)
NITRITE UR QL STRIP: NEGATIVE
PH UR STRIP: 6.5 [PH] (ref 5–9)
PLATELET # BLD: 173 10^3/UL (ref 130–400)
PMV BLD AUTO: 10.4 FL (ref 7.4–10.4)
POTASSIUM SERPL-SCNC: 3.8 MMOL/L (ref 3.6–5)
PROT SERPL-MCNC: 6.9 GM/DL (ref 6.4–8.2)
PROT UR QL STRIP: (no result)
RBC # BLD AUTO: 4.15 10^6/UL (ref 4.35–5.85)
RBC #/AREA URNS HPF: (no result) /HPF
SODIUM SERPL-SCNC: 138 MMOL/L (ref 135–145)
SP GR UR STRIP: 1.01 (ref 1.02–1.02)
SQUAMOUS #/AREA URNS HPF: (no result) /HPF
T4 FREE SERPL-MCNC: 0.89 NG/DL (ref 0.7–1.48)
UROBILINOGEN UR-MCNC: NORMAL MG/DL
WBC # BLD AUTO: 11.5 10^3/UL (ref 4.3–11)
WBC #/AREA URNS HPF: (no result) /HPF

## 2018-07-13 PROCEDURE — 85025 COMPLETE CBC W/AUTO DIFF WBC: CPT

## 2018-07-13 PROCEDURE — 96361 HYDRATE IV INFUSION ADD-ON: CPT

## 2018-07-13 PROCEDURE — 72125 CT NECK SPINE W/O DYE: CPT

## 2018-07-13 PROCEDURE — 80053 COMPREHEN METABOLIC PANEL: CPT

## 2018-07-13 PROCEDURE — 93041 RHYTHM ECG TRACING: CPT

## 2018-07-13 PROCEDURE — 84484 ASSAY OF TROPONIN QUANT: CPT

## 2018-07-13 PROCEDURE — 36415 COLL VENOUS BLD VENIPUNCTURE: CPT

## 2018-07-13 PROCEDURE — 96375 TX/PRO/DX INJ NEW DRUG ADDON: CPT

## 2018-07-13 PROCEDURE — 70450 CT HEAD/BRAIN W/O DYE: CPT

## 2018-07-13 PROCEDURE — 71250 CT THORAX DX C-: CPT

## 2018-07-13 PROCEDURE — 93005 ELECTROCARDIOGRAM TRACING: CPT

## 2018-07-13 PROCEDURE — 74176 CT ABD & PELVIS W/O CONTRAST: CPT

## 2018-07-13 PROCEDURE — 84443 ASSAY THYROID STIM HORMONE: CPT

## 2018-07-13 PROCEDURE — 72195 MRI PELVIS W/O DYE: CPT

## 2018-07-13 PROCEDURE — 81000 URINALYSIS NONAUTO W/SCOPE: CPT

## 2018-07-13 PROCEDURE — 84439 ASSAY OF FREE THYROXINE: CPT

## 2018-07-13 PROCEDURE — 96374 THER/PROPH/DIAG INJ IV PUSH: CPT

## 2018-07-13 RX ADMIN — HYDROCODONE BITARTRATE AND ACETAMINOPHEN PRN TAB: 5; 325 TABLET ORAL at 23:13

## 2018-07-13 RX ADMIN — FENTANYL CITRATE PRN MCG: 50 INJECTION, SOLUTION INTRAMUSCULAR; INTRAVENOUS at 11:28

## 2018-07-13 RX ADMIN — ATORVASTATIN CALCIUM SCH MG: 40 TABLET, FILM COATED ORAL at 20:22

## 2018-07-13 RX ADMIN — ASPIRIN SCH MG: 325 TABLET, COATED ORAL at 20:22

## 2018-07-13 RX ADMIN — ACETAMINOPHEN PRN MG: 500 TABLET ORAL at 23:12

## 2018-07-13 RX ADMIN — HYDROCODONE BITARTRATE AND ACETAMINOPHEN PRN TAB: 5; 325 TABLET ORAL at 16:36

## 2018-07-13 RX ADMIN — POLYETHYLENE GLYCOL (3350) SCH GM: 17 POWDER, FOR SOLUTION ORAL at 20:22

## 2018-07-13 RX ADMIN — ENOXAPARIN SODIUM SCH MG: 30 INJECTION SUBCUTANEOUS at 16:36

## 2018-07-13 NOTE — ED FALL/INJURY
General


Chief Complaint:  Trauma-Non Activation


Stated Complaint:  FALL


Nursing Triage Note:  


PT PRESENTS TO ER WITH COMPLAINT OF HIP PAIN AND HEAD PAIN AFTER FALLING 


YESTERDAY EVENING.


Source:  patient, other (friend)


Exam Limitations:  no limitations





History of Present Illness


Date Seen by Provider:  2018


Time Seen by Provider:  06:09


Initial Comments


This 89-year-old white female presents after falling yesterday evening 

sustaining trauma to the occiput and to her right hip.





The patient fell backwards as she was walking up the steps to her house hitting 

the back of her head and right hip.  The patient is complaining of pain over 

the right hip.  Although she's been able to weight-bear the walking increases 

her discomfort.  The patient denies loss of consciousness with her head trauma.

  The patient denies associated paresthesias or weakness in the extremities.  

She has mild neck tenderness in the paraspinous area.  She has no remarkable 

pain in the rest of her back.





The patient's having some mild problems with cognition.  She is unable to offer 

a fall and helpful history.





Patient's neighbor who brought the patient to the emergency department and sees 

the patient frequently states that the patient suffers from dementia.  She 

functions reasonably well in her home.  She seems unchanged to the neighbor.





Allergies and Home Medications


Allergies


Coded Allergies:  


     Sulfa (Sulfonamide Antibiotics) (Verified  Allergy, Unknown, 8/3/13)





Home Medications


Amlodipine Besylate 5 Mg Tablet, 5 MG PO DAILY, (Reported)


Aspirin 325 Mg Tab, 325 MG PO HS, (Reported)


Atenolol 50 Mg Tablet, 50 MG PO DAILY, (Reported)


Cholecalciferol (Vitamin D3) 400 Unit Tablet, 400 UNIT PO DAILY, (Reported)


Folic Acid/Mv,Fe,Other Min 1 Each Tablet, 1 TAB PO DAILY, (Reported)


Hydrocodone Bit/Acetaminophen 1 Each Tablet, 1 EACH PO Q4H PRN for PAIN


   Prescribed by: EVELIO DAVILA on 3/29/17 1500


Krill/Om-3/Dha/Epa/Phospho/Ast 1 Each Capsule, 1 CAP PO HS, (Reported)


Levothyroxine Sodium 75 Mcg Tablet, 75 MCG PO HS, (Reported)





Patient Home Medication List


Home Medication List Reviewed:  Yes





Review of Systems


Constitutional:  No chills; dizziness; No fever; weakness


Eyes:  Denies Blindness, Denies Blurred Vision, Denies Photophobia, Denies 

Vision Changes


Ears, Nose, Mouth, Throat:  denies ear pain, denies nose pain, denies epistaxis

, denies mouth pain


Respiratory:  No cough, No short of breath


Cardiovascular:  No chest pain, No palpitations


Gastrointestinal:  No abdominal pain, No nausea, No vomiting


Genitourinary:  dysuria


Pregnant:  No


Musculoskeletal:  see HPI, neck pain


Skin:  No lumps, No rash; other (patient denies abrasions from her fall.)


Psychiatric/Neurological:  See HPI, Other (mild dementia according to the 

neighbor)





Past Medical-Social-Family Hx


Past Med/Social Hx:  Reviewed Nursing Past Med/Soc Hx


Patient Social History


Alcohol Use:  Denies Use


Recreational Drug Use:  No


Smoking Status:  Current Everyday Smoker


2nd Hand Smoke Exposure:  No


Recent Foreign Travel:  No


Contact w/Someone Who Travel:  No


Recent Infectious Disease Expo:  No


Recent Hopitalizations:  No





Immunizations Up To Date


Tetanus Booster (TDap):  Unknown


PED Vaccines UTD:  No


Date of Pneumonia Vaccine:  Oct 1, 2013


Date of Influenza Vaccine:  Oct 1, 2013





Seasonal Allergies


Seasonal Allergies:  No





Past Medical History


Surgeries:  Yes (RIGHT KNEE AND LEFT HIP REPLACEMENTS)


Gallbladder, Hysterectomy, Joint Replacement, Orthopedic


Respiratory:  Yes (AT AGE 9 )


Pneumonia


Cardiac:  Yes


Heart Murmur, Hypertension, Valvular Heart Disease


Neurological:  No


Reproductive Disorders:  No


Bladder Infection


Gastrointestinal:  Yes


Obstructive Bowel, Chronic Constipation, Polyps


Musculoskeletal:  Yes (RIGH KNEE REPLACEMENT/LT HIP REPLACEMENT/ GENERALIZED 

ATHRITIS)


Arthritis


Endocrine:  Yes


Hypothyroidsim


Cataract


Hearing Impairment:  Hard of Hearing


Cancer:  Yes (POS  POLYP  3/14)


Psychosocial:  Yes


Depression


Integumentary:  No


Blood Disorders:  No


Adverse Reaction/Blood Tranf:  No





Family Medical History


Reviewed Nursing Family Hx





Myocardial infarction


  19 FATHER, Onset:86 (FATHER  OF UNKNOWN HEART PROBLEMS, PT STATES IT WAS 

PROBABLY A HEART ATTACK)


  19 MOTHER, Onset:70


  G8 BROTHER, Onset:46


  G8 SISTER


Heart Disease, Hypertension





Physical Exam


Vital Signs





Vital Signs - First Documented




















Capillary Refill : Less Than 3 Seconds


Height, Weight, BMI


Height: 5'1.00"


Weight: 110lbs. 0.0oz. 49.662206nd; 26.8 BMI


Method:Stated


General Appearance:  WD/WN, no apparent distress


HEENT:  normal ENT inspection


Neck:  supple


Cardiovascular:  normal peripheral pulses, regular rate, rhythm


Respiratory:  chest non-tender, lungs clear, normal breath sounds


Gastrointestinal:  normal bowel sounds, non tender, soft


Back:  normal inspection, no CVA tenderness, no vertebral tenderness


Extremities:  normal range of motion, other


Neurologic/Psychiatric:  no motor/sensory deficits, alert, normal mood/affect, 

oriented x 3, other (patient is not able to offer a full complete history.  She 

demonstrates no hallucinations, delusions, or flight of ideas.)


Skin:  normal color, warm/dry; No rash





Center Coma Score


Best Eye Response:  (4) Open Spontaneously


Best Verbal Response:  (5) Oriented


Best Motor Response:  (6) Obeys Commands


Center Total:  15





Progress/Results/Core Measures


Results/Orders


Lab Results





Laboratory Tests








Test


 18


05:54 Range/Units


 


 


White Blood Count


 11.5 H


 4.3-11.0


10^3/uL


 


Red Blood Count


 4.15 L


 4.35-5.85


10^6/uL


 


Hemoglobin 13.5  11.5-16.0  G/DL


 


Hematocrit 40  35-52  %


 


Mean Corpuscular Volume 96  80-99  FL


 


Mean Corpuscular Hemoglobin 33  25-34  PG


 


Mean Corpuscular Hemoglobin


Concent 34 


 32-36  G/DL





 


Red Cell Distribution Width 14.1  10.0-14.5  %


 


Platelet Count


 173 


 130-400


10^3/uL


 


Mean Platelet Volume 10.4  7.4-10.4  FL


 


Neutrophils (%) (Auto) 76 H 42-75  %


 


Lymphocytes (%) (Auto) 13  12-44  %


 


Monocytes (%) (Auto) 7  0-12  %


 


Eosinophils (%) (Auto) 4  0-10  %


 


Basophils (%) (Auto) 1  0-10  %


 


Neutrophils # (Auto) 8.8 H 1.8-7.8  X 10^3


 


Lymphocytes # (Auto) 1.5  1.0-4.0  X 10^3


 


Monocytes # (Auto) 0.8  0.0-1.0  X 10^3


 


Eosinophils # (Auto)


 0.5 H


 0.0-0.3


10^3/uL


 


Basophils # (Auto)


 0.1 


 0.0-0.1


10^3/uL


 


Sodium Level 138  135-145  MMOL/L


 


Potassium Level 3.8  3.6-5.0  MMOL/L


 


Chloride Level 107    MMOL/L


 


Carbon Dioxide Level 19 L 21-32  MMOL/L


 


Anion Gap 12  5-14  MMOL/L


 


Blood Urea Nitrogen 19 H 7-18  MG/DL


 


Creatinine


 0.94 


 0.60-1.30


MG/DL


 


Estimat Glomerular Filtration


Rate 56 


  





 


BUN/Creatinine Ratio 20   


 


Glucose Level 145 H   MG/DL


 


Calcium Level 9.2  8.5-10.1  MG/DL


 


Total Bilirubin 1.0  0.1-1.0  MG/DL


 


Aspartate Amino Transf


(AST/SGOT) 26 


 5-34  U/L





 


Alanine Aminotransferase


(ALT/SGPT) 20 


 0-55  U/L





 


Alkaline Phosphatase 73    U/L


 


Total Protein 6.9  6.4-8.2  GM/DL


 


Albumin 4.2  3.2-4.5  GM/DL


 


Thyroid Stimulating Hormone


(TSH) 14.86 H


 0.35-4.94


UIU/ML


 


Free Thyroxine


 0.89 


 0.70-1.48


NG/DL








My Orders





Orders - AMENA REDD MD


Fentanyl  Injection (Sublimaze Injection (18 07:30)


Mri Pelvis W/O Contrast (18 07:31)





Medications Given in ED





Current Medications








 Medications  Dose


 Ordered  Sig/Jung


 Route  Start Time


 Stop Time Status Last Admin


Dose Admin


 


 Fentanyl Citrate  50 mcg  ONCE  ONCE


 IVP  18 07:30


 18 07:31 DC 18 07:46


50 MCG








Vital Signs/I&O











 18





 05:41 05:41


 


Temp 98.4 98.4


 


Pulse 102 102


 


Resp 20 20


 


B/P (MAP) 143/76 (98) 143/76 (98)


 


Pulse Ox 93 93


 


O2 Delivery Room Air Room Air














Blood Pressure Mean:  98











Progress


Progress Note :  


   Time:  07:07


Progress Note


The patient's CT studies from her head to her pelvis demonstrated no evidence 

of acute pathology.





Patient's laboratory evaluation sans urinalysis was essentially unremarkable.





The patient was unable walk 4 steps without significant assistance.  I believe 

she would be a fall risk if I send her home where she lives alone.





7:48 am


Radiology called back and said that there was a fracture of the right SI joint.

  An MRI of the pelvis was ordered.





I discussed the presentation with Dr. Moore who was kind enough to admit the 

patient.  Ortho (Dr. PARADA) was consulted.





Patient was given 50 g of fentanyl IV.





Departure


Communication (Admissions)


Time/Spoke to Admitting Phy:  07:49


Dr. Helms


Time/Spoke to Consulting Phy:  07:50


Dr. Parada





Impression





 Primary Impression:  


 Fracture, pelvis closed


 Qualified Codes:  S32.9XXA - Fracture of unspecified parts of lumbosacral 

spine and pelvis, initial encounter for closed fracture


Disposition:   ADMITTED AS INPATIENT


Condition:  Improved





Admissions


Decision to Admit Reason:  Admit from ER (General)


Decision to Admit/Date:  2018


Time/Decision to Admit Time:  07:51





Departure-Patient Inst.


Referrals:  


YAZMIN CHAIREZ MD (PCP/Family)


Primary Care Physician











AMENA REDD MD 2018 06:13

## 2018-07-13 NOTE — DIAGNOSTIC IMAGING REPORT
PROCEDURE: CT head and CT cervical spine without contrast.



TECHNIQUE: Multiple contiguous axial images were obtained through

the brain and cervical spine without the use of intravenous

contrast. Sagittal and coronal reformations through the cervical

spine were then performed.



INDICATION: Head pain. Recent fall.



COMPARISON: MR brain dated 01/04/2012.



FINDINGS:

CT head: The ventricles and cortical sulci are diffusely

prominent, compatible with age-related volume loss. There are

confluent areas of abnormal, low attenuation in the

periventricular white matter. This is consistent with chronic

small vessel ischemic changes.  



There is no midline shift or mass-effect. No acute intra-axial

hemorrhage is seen. There are no abnormal areas of increased or

decreased density to suggest acute hemorrhage or edema. No

extra-axial masses or collections are present. Soft tissue

hematoma is noted posteriorly, midline. Underlying bony calvarium

is intact. The visualized paranasal sinuses are unremarkable. The

mastoid air cells are clear.



CT cervical spine: Evaluation of static alignment demonstrates

reversal of normal lordotic curvature epicentered at the C5

level. There is also slight grade 1 anterolisthesis of C3-C4 and

C4-C5. There is no evidence of jumped facets.



Vertebral body heights are maintained. There is no evidence of

acute fracture. No bony fragments are seen within the spinal

canal. There are moderate multilevel degenerative changes

consisting of intervertebral disc height loss with anterior and

posterior disc osteophyte complex formations and multilevel facet

arthropathy. These changes appear greatest at the C5-C6 level.



Pre-and paravertebral soft tissue structures are unremarkable.

Note is made of significant calcified carotid atherosclerosis.

Included portions of lung apices are unremarkable.



IMPRESSION:  

1. No acute intracranial abnormality. No CT evidence of mass,

acute infarct or intracranial hemorrhage.

2. Chronic small vessel ischemic changes in deep white matter.

3. No CT evidence of acute fracture or dislocation of cervical

spine.

4. Moderate multilevel degenerative changes, greatest at C5-C6

level.

5. Moderate calcified carotid atherosclerosis.



Dictated by: 



  Dictated on workstation # [id number]

## 2018-07-13 NOTE — PHYSICAL THERAPY EVALUATION
PT Evaluation-General


Medical Diagnosis


Admission Date


2018 at 07:45


Medical Diagnosis:  fall with right hip pain and fracture right SI joint


Onset Date:  2018





Therapy Diagnosis


Therapy Diagnosis:  debility





Height/Weight


Height (Feet):  5


Height (Inches):  1.00


Weight (Pounds):  113


Weight (Ounces):  7.0





Precautions


Precautions/Isolations:  Fall Prevention, Standard Precautions, Pressure Ulcer





Weight Bear Status


Right Lower Extremity:  Right


Weight Bearing/Tolerated


Left Lower Extremity:  Left


Weight Bearing/Tolerated





Referral


Physician:  Scooter


Reason for Referral:  Evaluation/Treatment





Medical History


Pertinent Medical History:  Dementia, HTN, Smoking


Additional Medical History


right TKR/left THR


Current History


POV secondary to fall/fell backward on steps to concrete and hit head and right 

hip


Reviewed History:  Yes





Social History


Home:  Single Level


Current Living Status:  Alone


Entry Into Home:  Stairs With Railing


PT Steps Into Home:  4





Prior/Core FIM


Prior Level of Function


              Functional [place] Measure


0=Not Assessed/NA   4=Minimal Assistance


1=Total Assistance   5=Supervision or Setup


2=Maximal Assistance   6=Modified [place]


3=Moderate Assistance   7=Complete [place]


Bed Mobility:  6


Transfers (B,C,W/C) (FIM):  6


Gait:  6 (furniture walk per her report)


per patient she has a FWW but does not utilize





PT Evaluation-Current


Subjective


Patient rates right pelvic pain 10/10.  RN notified.





Pain





   Numeric Pain Scale:  10-Worst Possible Pain


   Location:  Right


   Location Body Site:  Pelvic


   Pain Description:  Stabbing, Acute





Objective


Patient Orientation:  Normal For Age


Problem Solving:  Fair





ROM/Strength


ROM Lower Extremities


bilateral LE WNL


Strength Lower Extremities


right LE 4/5 grossly (limited due to pain)/left LE 4/5





Integumentary/Posture


Integumentary


refer to nursing notes


Bowel Incontinence:  No


Bladder Incontinence:  No


Posture


WFL





Neuromuscular


(Tone, Coordination, Reflexes)


grossly intact





Sensory


Vision:  Functional


Hearing:  Impaired


Sensation Right Lower Extremit:  Intact


Sensation Left Lower Extremity:  Intact





Transfers


              Functional [place] Measure


0=Not Assessed/NA   4=Minimal Assistance


1=Total Assistance   5=Supervision or Setup


2=Maximal Assistance   6=Modified [place]


3=Moderate Assistance   7=Complete [place]


Transfers (B, C, W/C) (FIM):  5


Scootin


Rollin


Supine to/from Sit:  5


Sit to/from Stand:  5


moves slow, however, functional





Gait


Mode of Locomotion:  Walk


Anticipated Mode of Locomotion:  Walk


Gait (FIM):  5


Distance (FIM):  3=150 ft


Distance:  150'


Gait Level of Assist:  5


Gait Assistive Device:  FWW


Comments/Gait Description


slow, antalgic, functional





Balance


Sitting Static:  Normal


Sitting Dynamic:  Normal


Standing Static:  Normal


 Standing Dynamic:  Fair





Assessment/Needs


89 y.o. female, will be seen short term by skilled PT to address functional 

mobility to ensure safe return to home or care facility at maximum LOF.


Rehab Potential:  Fair


Post Rehab Potential-Barriers:  pain





PT Long Term Goals


Long Term Goals


PT Long Term Goals Time Frame:  2018


Transfers (B,C,W/C) (FIM):  6


Gait (FIM):  6


Gait distance (FIM):  3=150 ft


Distance:  250'


Gait Level of Assist:  6


Gait Assistive Device:  FWW


Stairs (FIM):  5


# of Steps:  4


Stairs Level Of Assist:  5


household





PT Plan


Treatment/Plan


Treatment Plan:  Continue Plan of Care


Treatment Plan:  Bed Mobility, Education, Functional Activity Felisha, Functional 

Strength, Gait, Safety, Therapeutic Exercise


Treatment Duration:  2018


Frequency:  6 times per week


Estimated Hrs Per Day:  .25 hour per day


Patient and/or Family Agrees t:  Yes





Safety Risks/Education


Patient Education:  Gait Training


Teaching Recipient:  Patient


Teaching Methods:  Demonstration, Discussion


Response to Teaching:  Verbalize Understanding, Return Demonstration





Discharge Recommendations


Therapy D/C Recommendations:  Physical Therapy Home Care





Time/GCodes


Time In:  1330


Time Out:  1346


Total Billed Treatment Time:  16


Total Billed Treatment


1 visit


EVModC 16 min





PT/OT Therapy GCodes


Therapy


Functional Limitation:  Physical Therapy


Test(s)/Tool used to determine:  FIM





Functional Limitation-Current


Charge Code:  MOBCUR


Modifier:  CJ





Functional Limitation-Goal


Charge Code:  MOBGOAL


Modifier:  SASKIA CORDON PT 2018 14:01

## 2018-07-13 NOTE — HISTORY & PHYSICAL-HOSPITALIST
History of Present Illness


HPI/Chief Complaint


Pt is an 89yoCF who presented to the ER due to hip pain following a fall 

yesterday. She states she was walking down steps and her knee gave out and she 

fell down the a few stairs on the cement. She hit her head and hip and was not 

able to get up for a while but eventually was able to stand get around. Her 

pain continued to worsen and her friend insisted she come in today because she 

could only take a few steps. She had an CT which revealed a nondisplaced pelvis 

fracture and MRI confirmed this. Due to her very limited mobility and continued 

pain she was admitted for pain control and orthopedic evaluation. Of note she 

did complain to her nurse about chest pain. She originally denied this during 

my exam and then said she did have some chest pain early this morning but has 

not had it since arrival.


Source:  patient


Date Seen


18


Time Seen by Provider:  12:00


Attending Physician


Dexter Helms MD


PCP


Eric John MD


Referring Physician





Date of Admission


2018 at 07:45





Home Medications & Allergies


Home Medications


Reviewed patient Home Medication Reconciliation performed by pharmacy 

medication reconciliations technician and/or nursing.


Patients Allergies have been reviewed.





Allergies





Allergies


Coded Allergies


  Sulfa (Sulfonamide Antibiotics) (Verified Allergy, Unknown, 18)


    PT DOES NOT KNOW REACTION TO MED








Past Medical-Social-Family Hx


Past Med/Social Hx:  Reviewed Nursing Past Med/Soc Hx


Patient Social History


Alcohol Use:  Denies Use


Recreational Drug Use:  No


Smoking Status:  Current Everyday Smoker


2nd Hand Smoke Exposure:  No


Physical Abuse Screen:  No


Sexual Abuse:  No


Recent Foreign Travel:  No


Contact w/other who traveled:  No


Recent Hopitalizations:  No


Recent Infectious Disease Expo:  No





Immunizations Up To Date


Tetanus Booster (TDap):  Unknown


Pediatric:  No


Date of Pneumonia Vaccine:  Oct 1, 2013


Date of Influenza Vaccine:  Oct 1, 2013





Seasonal Allergies


Seasonal Allergies:  No





Past Medical History


Surgeries:  Gallbladder, Hysterectomy, Joint Replacement, Orthopedic


Cardiac:  Heart Murmur, Hypertension, Valvular Heart Disease


Reproductive:  No


Genitourinary:  Bladder Infection


Gastrointestinal:  Obstructive Bowel, Chronic Constipation, Polyps


Musculoskeletal:  Arthritis, Fractures


Endocrine:  Hypothyroidsim


HEENT:  Cataract


Hearing Impairment:  Hard of Hearing


Psychosocial:  Depression


History of Blood Disorders:  No


Adverse Reaction to Blood [place]:  No





Family History


Reviewed Nursing Family Hx





Myocardial infarction


  19 FATHER, Onset:86 (FATHER  OF UNKNOWN HEART PROBLEMS, PT STATES IT WAS 

PROBABLY A HEART ATTACK)


  19 MOTHER, Onset:70


  G8 BROTHER, Onset:46


  G8 SISTER


Heart Disease, Hypertension





Review of Systems


Constitutional:  No chills, No fever


EENTM:  No blurred vision, No double vision, No nose congestion, No throat pain


Respiratory:  No cough, No dyspnea on exertion, No short of breath


Cardiovascular:  chest pain; No edema, No palpitations


Gastrointestinal:  No abdominal pain, No constipation, No diarrhea, No nausea, 

No vomiting


Genitourinary:  No dysuria, No frequency


Musculoskeletal:  see HPI


Skin:  No lesions, No rash


Psychiatric/Neurological:  Denies Headache, Denies Numbness, Denies Tingling





Physical Exam


Physical Exam


Vital Signs





Vital Signs - First Documented








 18





 09:02


 


O2 Flow Rate 0





Capillary Refill : Less Than 3 SecondsLess Than 3 Seconds


Height, Weight, BMI


Height: 5'1.00"


Weight: 113lbs. 7.0oz. 51.257889oe; 21.4 BMI


Method:Stated


General Appearance:  No Apparent Distress, WD/WN


HEENT:  PERRL/EOMI, Moist Mucous Membranes


Neck:  Non Tender, Supple


Respiratory:  Lungs Clear, No Respiratory Distress


Cardiovascular:  Regular Rate, Rhythm, No Murmur


Gastrointestinal:  Normal Bowel Sounds, Non Tender, Soft


Extremity:  Normal Capillary Refill, No Calf Tenderness


Neurologic/Psychiatric:  Alert, Oriented x3 (though slightly confused ), Normal 

Mood/Affect


Skin:  Normal Color, Warm/Dry





Results


Results/Procedures


Labs


Laboratory Tests


18 05:54








Patient resulted labs reviewed.


Imaging:  Reviewed Imaging Report





Assessment/Plan


Admission Diagnosis


Right Ilium Fracture


Admission Status:  Inpatient Order (span 2 midnights)


Reason for Inpatient Admission:  


Ortho Consult, Pain management





Diagnosis/Problems


Diagnosis/Problems





(1) Fracture, pelvis closed


Status:  Acute


Assessment & Plan:  Nondisplaced fracture noted on imaging


Ortho consulted, appreciate recs


I discussed with Dr Parada- no operative repair needed


PT/OT consulted


Fentanyl for pain


   Will add bowel regimen


Qualifiers:  


   Encounter type:  initial encounter  Pelvic bone location:  unspecified part 

of pelvis  Fracture alignment:  nondisplaced  Qualified Codes:  S32.9XXA - 

Fracture of unspecified parts of lumbosacral spine and pelvis, initial 

encounter for closed fracture


(2) Hypertension


Status:  Chronic


Assessment & Plan:  BP well controlled, trend


Qualifiers:  


   Hypertension type:  essential hypertension  Qualified Codes:  I10 - 

Essential (primary) hypertension


(3) Hypothyroid


Status:  Chronic


Assessment & Plan:  Resume home medicines


   Concern that high TSH due to noncompliance


Qualifiers:  


   Hypothyroidism type:  acquired  Qualified Codes:  E03.9 - Hypothyroidism, 

unspecified


(4) Chest pain of uncertain etiology


Status:  Acute


Assessment & Plan:  Now resolved


Troponin negative


EKG unchanged from previous, no STEMI


Monitor








Clinical Quality Measures


DVT/VTE Risk/Contraindication:


Risk Factor Score Per Nursin


RFS Level Per Nursing on Admit:  4+=Very High











DEXTER HELMS MD 2018 12:07 pm

## 2018-07-13 NOTE — DIAGNOSTIC IMAGING REPORT
PROCEDURE: MRI pelvis without contrast.



TECHNIQUE: Multiplanar, multisequence MRI of the pelvis was

performed without contrast.



INDICATION: Right hip pain after fall. Known ilium fracture.



COMPARISON: CT abdomen and pelvis of 07/13/2018.



FINDINGS:



Bones: Left total hip arthroplasty result in signal void artifact

limiting assessment of the surrounding soft tissue and osseous

structures. Specifically, the area of geographic osteolysis at

the superior aspect acetabular cup seen on CT is not well

visualized on this MRI examination. The acute fracture in the

posterior aspect of the right ilium adjacent to the SI joint is

again noted and has surrounding edema. There is no definitive

extension into the sacroiliac joint. Allowing for artifact in the

left sacrum from the hip arthroplasty, there is no additional

acute fracture within the pelvis. No fracture of the proximal

right femur.



Soft tissues: There is interstitial non-masslike edema within the

right gluteus deborah muscle at its superior aspect indicative of

muscle strain and/or contusion. There is no tendon tear around

the right hip. No hip effusion on the right. No peritrochanteric

fluid collection.



IMPRESSION:

1. No additional fracture in the pelvis outside of the acute

nondisplaced fracture in the posterior right ilium seen on CT of

earlier same day.

2. Muscle strain and/or contusion involving the superior aspect

of the right gluteus deborah. No tendon tear around the right

hip.



Dictated by: 



  Dictated on workstation # [id number]

## 2018-07-13 NOTE — OCCUPATIONAL THERAPY EVAL
OT Evaluation-General/PLF


Medical Diagnosis


Admission Date


Jul 13, 2018 at 07:45


Medical Diagnosis:  fall with right hip pain and fracture right SI joint


Onset Date:  Jul 12, 2018





Therapy Diagnosis


Therapy Diagnosis:  decr self care, decr funct mobility, weakness





Height/Weight


Height (Feet):  5


Height (Inches):  1.00


Weight (Pounds):  113


Weight (Ounces):  7.0





Precautions


Precautions/Isolations:  Fall Prevention, Standard Precautions, Pressure Ulcer





Weight Bear Status


Weight Bearing Restriction:  Weight Bearing/Tolerated


Location Restriction:  LE Bilateral





Referral


Physician:  Scooter


Referral Reason:  Evaluation/Treatment





Medical History


Pertinent Medical History:  Arthritis, Dementia, HTN, Hypothroidism, Smoking


Additional Medical History


R THR, L TKR, pneumonia, heart murmur, valvular disease. cataracts. [place] of 

hearing. Depression. Chronic constipation


Current History


Fell at home, hitting back of head and hip. Has R SI joint fracture. Friend 

reported that she has been having leg pain in the mornings and was scheduled to 

see Dr Pisano next week.


Reviewed History:  Yes





Social History


Home:  mobile home


Current Living Status:  Alone


Entry Into Home:  Stairs With Railing


 Steps Into Home:  4





ADL-Prior Level of Function


ADL PLOF Comments


Pt reported that she has been able to manage her basic self care tasks, 

maintain her house and drive "a little". She is retired from clerical work and 

also worked as a .


DME/Equipment:  Shower, Tub





OT Current Status


Subjective


Pt seen in room, up in bed, agreeable to OT. Pain reported 5/10 and in her back.





Appearance


Awake but a little drowsy. Cooperative. Confused on month but got year correct. 

Did not recall that she had gone for a walk with PT a few minutes earlier.





Mental Status/Objective


Patient Orientation:  Person


Attachments:  IV





Current


Glasses/Contacts:  Yes


Hand Dominance:  Right


Upper Extremity ROM


Grossly WFL bilat


Upper Extremity Strength


grossly 4/5 bilat





ADL-Treatment


ADL-Current


Pt had just gotten back to bed and did not want to get u p. She walked 150 feet 

with SBA with PT, slowly but functionally. Pt reported that she has gotten on/

off toilet in bathroom here and didn't have any difficulty.


              Functional [place] Measure


0=Not Assessed/NA   4=Minimal Assistance


1=Total Assistance   5=Supervision or Setup


2=Maximal Assistance   6=Modified [place]


3=Moderate Assistance   7=Complete IndependenceIRFPAI Quality Coding Scale











6 Independent with activity with or without an assistive device


 


5  Patient requires set up or clean up by helper.  Patient completes activity  

by  themselves


 


4 Supervision or touching assist (CGA). [place] provide cues , steadying assist


 


3 The helper provides less than half the effort to complete the activity


 


2 The helper provides more than half the effort to complete the activity


 


1 Dependent.  The helper does all the effort to complete an activity 


 


7 Patient refused to complete or attempt activity


 


9 The patient did not perform the activity before the current illness or injury


 


88 Not attempted due to Medical conditions or safety concerns











Education


OT Patient Education:  Purpose of tx/functional activities, Rehab process


Teaching Recipient:  Patient


Teaching Methods:  Discussion


Response to Teaching:  Verbalize Understanding





OT Long Term Goals


Long Term Goals


Time Frame:  Jul 20, 2018


Eating (FIM):  6


Grooming(FIM):  6


Bathing(FIM):  6


Upper Body Dressing(FIM):  6


Lower Body Dressing(FIM):  6


Toileting(FIM):  6


Toilet/Commode Transfer(FIM):  6


Shower Transfer(FIM):  5


Additional Goals:  1-Demonstrate ADL Tasks, 2-Verbalize Understanding, 3-

ImproveStrength/Felisha


1=Demonstrate adherence to instructed precautions during ADL tasks.


2=Patient will verbalize/demonstrate understanding of assistive devices/

modifications for ADL.


3=Patient will improve strength/tolerance for activity to enable patient to 

perform ADL's.





OT Education/Plan


Problem List/Assessment


Assessment:  Decreased UE Strength, Dependent Transfers, Impaired Self-Care 

Skills


Pt would benefit from skilled OT to increase her independence ini basic self 

care





Discharge Recommendations


Plan/Recommendations:  Continue POC





Treatment Plan/Plan of Care


Treatment,Training & Education:  Yes


Patient would benefit from OT for education, treatment and training to promote 

independence in ADL's, mobility, safety and/or upper extremity function for ADL'

s.


Plan of Care:  ADL Retraining, Functional Mobility, UE Funct Exercise/Act, UE 

Neuromus Re-Ed/Coord


Treatment Duration:  Jul 20, 2018


Frequency:  5 times per week


Estimated Hrs Per Day:  .5 hour per day


Agreement:  Yes


Rehab Potential:  Fair





Time/GCodes


Start Time:  14:07


Stop Time:  14:21


Total Time Billed (hr/min):  14


Billed Treatment Time


visit, evaluation low intensity





PT/OT Therapy GCodes


Therapy


Functional Limitation:  Physical Therapy


Test(s)/Tool used to determine:  FIM





Functional Limitation-Current


Charge Code:  [id number]


Modifier:  CJ





Functional Limitation-Goal


Charge Code:  [id number]


Modifier:  SYD LUCAS OT Jul 13, 2018 14:57

## 2018-07-13 NOTE — XMS REPORT
Continuity of Care Document

 Created on: 2018



ROSARIO TELLO

External Reference #: [id number]

: 1928

Sex: Female



Demographics







 Address  [address] [place]  [address]

 

 Home Phone  [phone] x

 

 Preferred Language  Unknown

 

 Marital Status  Unknown

 

 Mormonism Affiliation  Unknown

 

 Race  Unknown

 

 Ethnic Group  Unknown





Author







 Author  [facility]

 

 Organization  [facility]

 

 Address  Unknown

 

 Phone  Unavailable



              



Allergies

      





 Active            Description            Code            Type            
Severity            Reaction            Onset            Reported/Identified   
         Relationship to Patient            Clinical Status        

 

 Yes            Sulfa (Sulfonamide Antibiotics)            [id number]          
  Drug Allergy            Unknown            N/A                         2013                                  



                  



Medications

      



There is no data.                  



Problems

      





 Date Dx Coded            Attending            Type            Code            
Diagnosis            Diagnosed By        

 

 2011                         Ot            211.3            BENIGN 
NEOPLASM LG BOWEL                     

 

 2011                         Ot            244.9            
HYPOTHYROIDISM NOS                     

 

 2011                         Ot            250.00            DIAB ABIGAIL 
WO COMPL, TYPE II OR UNSPEC TY                     

 

 2011                         Ot            272.0            PURE 
HYPERCHOLESTEROLEM                     

 

 2011                         Ot            401.9            HYPERTENSION 
NOS                     

 

 2011                         Ot            530.81            ESOPHAGEAL 
REFLUX                     

 

 2011                         Ot            553.3            
DIAPHRAGMATIC HERNIA                     

 

 2011                         Ot            562.12            
DIVERTICULOSIS OF COLON WITH HEMORRHAGE                     

 

 2011                         Ot            578.1            BLOOD IN 
STOOL                     

 

 2012                         Ot            433.10            CAROTID 
ARTERY OCCLUSION W O CEREBRAL IN                     

 

 2012                         Ot            435.2            SUBCLAVIAN 
STEAL SYNDROM                     

 

 2012                         Ot            V43.64            HIP JOINT 
REPLACEMENT STATUS                     

 

 2012                         Ot            V45.77            ACQRD 
ABSENCE OF GENITAL ORGANS                     

 

 2012                         Ot            V45.79            ACQRD 
ABSENCE OF OTH ORGAN                     

 

 2012                         Ot            V58.69            OTH MED,LT,
CURRENT USE                     

 

 2012                         Ot            244.9            
HYPOTHYROIDISM NOS                     

 

 2012                         Ot            250.00            DIAB ABIGAIL 
WO COMPL, TYPE II OR UNSPEC TY                     

 

 2012                         Ot            272.4            
HYPERLIPIDEMIA NEC/NOS                     

 

 2012                         Ot            362.34            TRANSIENT 
ARTERIAL OCCLU                     

 

 2012                         Ot            396.3            MITRAL/
AORTIC RAHAT INSUFF                     

 

 2012                         Ot            401.9            HYPERTENSION 
NOS                     

 

 2012                         Ot            426.4            RT BUNDLE 
BRANCH BLOCK                     

 

 2012                         Ot            433.10            CAROTID 
ARTERY OCCLUSION W O CEREBRAL IN                     

 

 2012                         Ot            440.0            AORTIC 
ATHEROSCLEROSIS                     

 

 2012                         Ot            V12.54            PERSONAL HX 
OF TIA,  CEREBRAL INFARCTION                     

 

 2012                         Ot            V58.66            LONG-TERM (
CURRENT) USE OF ASPIRIN                     

 

 2012                         Ot            V58.69            OTH MED,LT,
CURRENT USE                     

 

 2013            NATA PERRY MD            Ot            244.9     
       HYPOTHYROIDISM NOS                     

 

 2013            NATA PERRY MD            Ot            250.00    
        DIAB ABIGAIL WO COMPL, TYPE II OR UNSPEC TY                     

 

 2013            NATA PERRY MD            Ot            272.4     
       HYPERLIPIDEMIA NEC/NOS                     

 

 2013            NATA PERRY MD            Ot            401.9     
       HYPERTENSION NOS                     

 

 2013            NATA PERRY MD            Ot            530.81    
        ESOPHAGEAL REFLUX                     

 

 2013            NATA PERRY MD            Ot            715.36    
        LOC OSTEOARTH NOS-L/LEG                     

 

 2013            LARY SHEPPARD, ROSE E            Ot            244.9        
    HYPOTHYROIDISM NOS                     

 

 2013            LARY SHEPPARD, ROSE E            Ot            250.00       
     DIAB ABIGAIL WO COMPL, TYPE II OR UNSPEC TY                     

 

 2013            LARY SHEPPARD, ROSE E            Ot            272.4        
    HYPERLIPIDEMIA NEC/NOS                     

 

 2013            LARY SHEPPARD, ROSE E            Ot            285.9        
    ANEMIA NOS                     

 

 2013            LARY SHEPPARD, ROSE E            Ot            401.9        
    HYPERTENSION NOS                     

 

 2013            LARY SHEPPARD, ROSE E            Ot            443.9        
    PERIPH VASCULAR DIS NOS                     

 

 2013            LARY SHEPPARD, ROSE E            Ot            530.81       
     ESOPHAGEAL REFLUX                     

 

 2013            LARY SHEPPARD, ROSE E            Ot            716.90       
     ARTHROPATHY NOS-UNSPEC                     

 

 2013            LARY SHEPPARD, ROSE E            Ot            786.09       
     RESPIRATORY ABNORM NEC                     

 

 2013            LARY SHEPPARD, ROSE E            Ot            V43.65       
     KNEE JOINT REPLACEMENT STATUS                     

 

 2013            LARY SHEPPARD, ROSE E            Ot            V54.81       
     AFTERCARE FOLLOWING JOINT REPLACEMENT                     

 

 2013            LARY SHEPPARD, ROSE E            Ot            V57.89       
     REHABILITATION PROC NEC                     

 

 2013                         Ot            041.3            KLEBSIELLA 
PNEUMONIAE                     

 

 2013                         Ot            041.49            OTHER AND 
UNSPECIFIED ESCHERICHIA COLI [                     

 

 2013                         Ot            041.85            BACTERIAL 
INFEC DUE TO OTH GRAM-NEG ORGA                     

 

 2013                         Ot            244.9            
HYPOTHYROIDISM NOS                     

 

 2013                         Ot            250.00            DIAB ABIGAIL 
WO COMPL, TYPE II OR UNSPEC TY                     

 

 2013                         Ot            272.4            
HYPERLIPIDEMIA NEC/NOS                     

 

 2013                         Ot            401.9            HYPERTENSION 
NOS                     

 

 2013                         Ot            414.01            CORONARY 
ATHEROSCLEROSIS OF NATIVE CORON                     

 

 2013                         Ot            590.10            AC 
PYELONEPHRITIS NOS                     

 

 2013                         Ot            V15.82            HISTORY OF 
TOBACCO USE                     

 

 09/10/2013            GAYLA DONOHUE DO            Ot            211.3       
     BENIGN NEOPLASM LG BOWEL                     

 

 09/10/2013            GAYLA DONOHUE DO            Ot            578.1       
     BLOOD IN STOOL                     

 

 01/15/2014            CLAUDIA DIXON MD            Ot            211.4         
   BENIGN NEOPL RECTUM/ANUS                     

 

 01/15/2014            CLAUDIA DIXON MD            Ot            455.0         
   INT HEMORRHOID W/O COMPL                     

 

 01/15/2014            CLAUDIA DIXON MD            Ot            455.3         
   EXT HEMORRHOID W/O COMPL                     

 

 01/15/2014            CLAUDIA DIXON MD            Ot            562.10        
    DIVERTICULOSIS COLON (W/O MENT OF HEMORR                     

 

 2014            EDUARDO WILSON MD            Ot            153.9     
       MALIGNANT DEBBIE COLON NOS                     

 

 2014            EDUARDO WILSON MD            Ot            244.9     
       HYPOTHYROIDISM NOS                     

 

 2014            EDUARDO WILSON MD            Ot            250.00    
        DIAB ABIGAIL WO COMPL, TYPE II OR UNSPEC TY                     

 

 2014            EDUARDO WILSON MD            Ot            272.0     
       PURE HYPERCHOLESTEROLEM                     

 

 2014            EDUARDO WILSON MD            Ot            272.4     
       HYPERLIPIDEMIA NEC/NOS                     

 

 2014            EDUARDO WILSON MD            Ot            311       
     DEPRESSIVE DISORDER NEC                     

 

 2014            EDUARDO WILSON MD            Ot            338.29    
        OTHER CHRONIC PAIN                     

 

 2014            EDUARDO WILSON MD            Ot            396.3     
       MITRAL/AORTIC RAHAT INSUFF                     

 

 2014            EDUARDO WILSON MD            Ot            397.0     
       TRICUSPID VALVE DISEASE                     

 

 2014            EDUARDO WILSON MD            Ot            401.9     
       HYPERTENSION NOS                     

 

 2014            EDUARDO WILSON MD            Ot            414.01    
        CORONARY ATHEROSCLEROSIS OF NATIVE CORON                     

 

 2014            EDUARDO WILSON MD            Ot            426.4     
       RT BUNDLE BRANCH BLOCK                     

 

 2014            EDUARDO WILSON MD            Ot            427.61    
        ATRIAL PREMATURE BEATS                     

 

 2014            EDUARDO WILSON MD            Ot            427.9     
       CARDIAC DYSRHYTHMIA NOS                     

 

 2014            EDUARDO WILSON MD            Ot            433.10    
        CAROTID ARTERY OCCLUSION W O CEREBRAL IN                     

 

 2014            KATIE MD, EDUARDO A            Ot            443.9     
       PERIPH VASCULAR DIS NOS                     

 

 2014            KATIE SHEPPARD, EDUARDO PHELAN            Ot            716.90    
        ARTHROPATHY NOS-UNSPEC                     

 

 2014            EDUARDO WILSON MD            Ot            719.41    
        JOINT PAIN-SHLDER                     

 

 2014            EDUARDO WILSON MD            Ot            724.5     
       BACKACHE NOS                     

 

 2014            EDUARDO WILSON MD            Ot            785.2     
       CARDIAC MURMURS NEC                     

 

 2014            EDUARDO WILSON MD            Ot            786.59    
        CHEST PAIN NEC                     

 

 2014            EDUARDO WILSON MD            Ot            V17.49    
        FAMILY HISTORY OF OTHER CARDIOVASCULAR D                     

 

 2014            JASON CHACON MD            Ot            153.3          
  MAL DEBBIE SIGMOID COLON                     

 

 2014            JASON CHACON MD            Ot            244.9          
  HYPOTHYROIDISM NOS                     

 

 2014            JASON CHACON MD            Ot            250.00         
   DIAB ABIGAIL WO COMPL, TYPE II OR UNSPEC TY                     

 

 2014            JASON CHACON MD            Ot            272.0          
  PURE HYPERCHOLESTEROLEM                     

 

 2014            JASON CHACON MD            Ot            401.9          
  HYPERTENSION NOS                     

 

 2014            JASON CHACON MD            Ot            414.00         
   CORON ATHEROSCLER NOS TYPE VESSEL, NATIV                     

 

 2014            JASON CHACON MD            Ot            153.3          
  MAL DEBBIE SIGMOID COLON                     

 

 2014            JASON CHACON MD            Ot            244.9          
  HYPOTHYROIDISM NOS                     

 

 2014            JASON CHACON MD            Ot            250.00         
   DIAB ABIGAIL WO COMPL, TYPE II OR UNSPEC TY                     

 

 2014            JASON CHACON MD            Ot            272.0          
  PURE HYPERCHOLESTEROLEM                     

 

 2014            JASON CHACON MD            Ot            401.9          
  HYPERTENSION NOS                     

 

 2014            JASON CHACON MD            Ot            414.00         
   CORON ATHEROSCLER NOS TYPE VESSEL, NATIV                     

 

 2014            DER WRIGHT            Ot            
250.00                                  

 

 2014            DRE WRIGHT            Ot            
272.0                                  

 

 2014            DRE WRIGHT            Ot            
401.9                                  

 

 2014            DRE WRIGHT            Ot            
434.91                                  

 

 2014            DRE WRIGHT            Ot            
786.50                                  

 

 2014            JASON CHACON MD            Ot            153.3          
                        

 

 2014            JASON CHACON MD            Ot            244.9          
                        

 

 2014            JASON CHACON MD            Ot            250.00         
                         

 

 2014            INES SHEPPARD, JASON JARETT            Ot            272.0          
                        

 

 2014            INES SHEPPARD, JASON K            Ot            401.9          
                        

 

 2014            INES SHEPPARD, JASON K            Ot            414.00         
                         

 

 2014            INES SHEPPARD, JASON K            Ot            153.3          
                        

 

 2014            INES SHEPPARD, JASON K            Ot            244.9          
                        

 

 2014            INES SHEPPARD, JASON JARETT            Ot            250.00         
                         

 

 2014            INES SHEPPARD, JASON K            Ot            272.0          
                        

 

 2014            INES SHEPPARD, JASON K            Ot            401.9          
                        

 

 2014            INES SHEPPARD, JASON K            Ot            414.00         
                         

 

 2014            INES SHEPPARD, JASON DENSON            Ot            153.3          
                        

 

 2014            INES SHEPPARD, JASON K            Ot            244.9          
                        

 

 2014            INES SHEPPARD, JASON K            Ot            250.00         
                         

 

 2014            INES SHEPPARD, JASON DENSON            Ot            272.0          
                        

 

 2014            INES SHEPPARD, JASON K            Ot            401.9          
                        

 

 2014            INES SHEPPARD, JASON DENSON            Ot            414.00         
                         

 

 2015            INES SHEPPARD, JASON DENSON            Ot            153.3          
                        

 

 2015            INES SHEPPARD, JASON DENSON            Ot            244.9          
                        

 

 2015            INES SHEPPARD, JASON K            Ot            250.00         
                         

 

 2015            INES SHEPPARD, JASON DENSON            Ot            272.0          
                        

 

 2015            INES SHEPPARD, JASON DENSON            Ot            401.9          
                        

 

 2015            INES SHEPPARD, JASON DENSON            Ot            414.00         
                         

 

 2015            INES SHEPPARD, JASON DENSON            Ot            V58.69         
                         

 

 2015            INES SHEPPARD, JASON DENSON            Ot            153.3          
                        

 

 2015            INES SHEPPARD, JASON DENSON            Ot            244.9          
                        

 

 2015            INES SHEPPARD, JASON DENSON            Ot            250.00         
                         

 

 2015            INES SHEPPARD, JASON DENSON            Ot            272.0          
                        

 

 2015            INES SHEPPARD, JASON K            Ot            401.9          
                        

 

 2015            INES SHEPPARD, JASON DENSON            Ot            414.00         
                         

 

 2015            INES SHEPPARD, JASON K            Ot            V58.69         
                         

 

 2015            INES SHEPPARD, JASON K            Ot            153.3          
  MAL DEBBIE SIGMOID COLON                     

 

 2015            INES SHEPPARD, JASON DENSON            Ot            244.9          
  HYPOTHYROIDISM NOS                     

 

 2015            INES SHEPPARD, JASON DENSON            Ot            250.00         
   DIAB ABIGAIL WO COMPL, TYPE II OR UNSPEC TY                     

 

 2015            INES SHEPPARD, JASON DENSON            Ot            272.0          
  PURE HYPERCHOLESTEROLEM                     

 

 2015            INES SHEPPARD, JASON DENSON            Ot            401.9          
  HYPERTENSION NOS                     

 

 2015            INES SHEPPARD, JASON DENSON            Ot            414.00         
   CORON ATHEROSCLER NOS TYPE VESSEL, NATIV                     

 

 2015            INES SHEPPARD, JASON DENSON            Ot            V58.69         
   OT MED,LT,CURRENT USE                     

 

 2015            INES SHEPPARD, JASON DENSON            Ot            153.3          
                        

 

 2015            INES SHEPPARD, JASON DENSON            Ot            244.9          
                        

 

 2015            INES SHEPPARD, JASON DENSON            Ot            250.00         
                         

 

 2015            INES SHEPPARD, JASON DENSON            Ot            272.0          
                        

 

 2015            INES SHEPPARD, JASON K            Ot            401.9          
                        

 

 2015            INES SHEPPARD, JASON K            Ot            414.00         
                         

 

 2015            INES SHEPPARD, JASON K            Ot            V58.69         
                         

 

 2015            INES SHEPPARD, JASON DENSON            Ot            153.3          
                        

 

 2015            INES SHEPPARD, JASON K            Ot            244.9          
                        

 

 2015            INES SHEPPARD, JASON K            Ot            250.00         
                         

 

 2015            INES SHEPPARD, JASON K            Ot            272.0          
                        

 

 2015            INES SHEPPARD, JASON K            Ot            401.9          
                        

 

 2015            INES SHEPPARD, JASON K            Ot            414.00         
                         

 

 2015            INES SHEPPARD, JASON K            Ot            V58.69         
                         

 

 2015            INES SHEPPARD, JASON K            Ot            153.3          
                        

 

 2015            INES SHEPPARD, JASON K            Ot            244.9          
                        

 

 2015            INES SHEPPARD, JASON K            Ot            250.00         
                         

 

 2015            INES SHEPPARD, JASON K            Ot            272.0          
                        

 

 2015            INES SHEPPARD, JASON K            Ot            401.9          
                        

 

 2015            INES SHEPPARD, JASON K            Ot            414.00         
                         

 

 2015            INES SHEPPARD, JASON K            Ot            V58.69         
                         

 

 2015            INES SHEPPARD, JASON K            Ot            153.3          
                        

 

 2015            INES SHEPPARD, JASON K            Ot            244.9          
                        

 

 2015            INES SHEPPARD, JASON K            Ot            250.00         
                         

 

 2015            INES SHEPPARD, JASON K            Ot            272.0          
                        

 

 2015            INES SHEPPARD, JASON K            Ot            401.9          
                        

 

 2015            INES SHEPPARD, JASON K            Ot            414.00         
                         

 

 2015            INES SHEPPARD, JASON K            Ot            V58.69         
                         

 

 2015            INES SHEPPARD, JASON K            Ot            153.3          
                        

 

 2015            INES SHEPPARD, JASON K            Ot            244.9          
                        

 

 2015            INES SHEPPARD, JASON K            Ot            250.00         
                         

 

 2015            INES SHEPPARD, JASON K            Ot            272.0          
                        

 

 2015            INES SHEPPARD, JASON K            Ot            401.9          
                        

 

 2015            INES SHEPPARD, JASON DENSON            Ot            414.00         
                         

 

 2015            INES SHEPPARD, JASON DENSON            Ot            V58.69         
                         

 

 2015            INES SHEPPARD, JASON DENSON            Ot            153.3          
                        

 

 2015            INES SHEPPARD, JASON DENSON            Ot            244.9          
                        

 

 2015            INES SHPEPARD, JASON DENSON            Ot            250.00         
                         

 

 2015            INES SHEPPARD, JASON DENSON            Ot            272.0          
                        

 

 2015            INES SHEPPARD, JASON DENSON            Ot            401.9          
                        

 

 2015            INES SHEPPARD, JASON DENSON            Ot            414.00         
                         

 

 2015            INES SHEPPARD, JASON DENSON            Ot            V58.69         
                         

 

 2015            INES SHEPPARD, JASON DENSON            Ot            153.3          
  MAL DEBBIE SIGMOID COLON                     

 

 2015            INES SHEPPARD, JASON DENSON            Ot            244.9          
  HYPOTHYROIDISM NOS                     

 

 2015            INES SHEPPARD, JASON DENSON            Ot            250.00         
   DIAB ABIGAIL WO COMPL, TYPE II OR UNSPEC TY                     

 

 2015            JASON CHACON MD            Ot            272.0          
  PURE HYPERCHOLESTEROLEM                     

 

 2015            INES SHEPPARD, JASON DENSON            Ot            401.9          
  HYPERTENSION NOS                     

 

 2015            INES SHEPPARD, JASON DENSON            Ot            414.00         
   CORON ATHEROSCLER NOS TYPE VESSEL, NATIV                     

 

 2015            INES SHEPPARD, JASON DENSON            Ot            V58.69         
   OTH MED,LT,CURRENT USE                     

 

 10/23/2015            KATIE SHEPPARD, EDUARDO PHELAN            Ot            E03.9     
       HYPOTHYROIDISM, UNSPECIFIED                     

 

 10/23/2015            EDUARDO WILSON MD            Ot            E11.9     
       TYPE 2 DIABETES MELLITUS WITHOUT COMPLIC                     

 

 10/23/2015            EDUARDO WILSON MD            Ot            I10       
     ESSENTIAL (PRIMARY) HYPERTENSION                     

 

 10/23/2015            EDUARDO WILSON MD            Ot            R07.9     
       CHEST PAIN, UNSPECIFIED                     

 

 10/23/2015            KATIE SHEPPARD, EDUARDO PHELAN            Ot            Z87.891   
         PERSONAL HISTORY OF NICOTINE DEPENDENCE                     

 

 2015            INES SHEPPARD, JSAON DENSON            Ot            153.3          
                        

 

 2015            INES SHEPPARD, JASON DENSON            Ot            244.9          
                        

 

 2015            INES SHEPPARD, JASON DENSON            Ot            250.00         
                         

 

 2015            INES SHEPPARD, JASON DENSON            Ot            272.0          
                        

 

 2015            JASON CHACON MD            Ot            401.9          
                        

 

 2015            INES SHEPPARD, JASON DENSON            Ot            414.00         
                         

 

 2015            JASON CHACON MD            Ot            V58.69         
                         

 

 2015            INES SHEPPARD, JASON DENSON            Ot            153.3          
                        

 

 2015            JASON CHACON MD            Ot            244.9          
                        

 

 2015            INES SHEPPARD, JASON K            Ot            250.00         
                         

 

 2015            INES SHEPPARD, JASON K            Ot            272.0          
                        

 

 2015            INES SHEPPARD, JASON K            Ot            401.9          
                        

 

 2015            INES SHEPPARD, JASON K            Ot            414.00         
                         

 

 2015            INES SHEPPARD, JASON DENSON            Ot            V58.69         
                         

 

 2016            INES SHEPPARD, JASON K            Ot            C18.7          
                        

 

 2016            INES SHEPPARD, JASON K            Ot            E03.9          
                        

 

 2016            INES SHEPPARD, JASON DENSON            Ot            E11.9          
                        

 

 2016            INES SHEPPARD, JASON K            Ot            E78.0          
                        

 

 2016            INES SHEPPARD, JASON K            Ot            I10            
                      

 

 2016            INES SHEPPARD, JASON DENSON            Ot            I25.10         
                         

 

 2016            INES SHEPPARD, JASON DENSON            Ot            Z79.899        
                          

 

 2016            INES SHEPPARD, JASON DENSON            Ot            C18.7          
                        

 

 2016            INES SHEPPARD, JASON DENSON            Ot            E03.9          
                        

 

 2016            INES SHEPPARD, JASON DENSON            Ot            E11.9          
                        

 

 2016            INES SHEPPARD, JASON DENSON            Ot            E78.0          
                        

 

 2016            IENS SHEPPARD, JASON K            Ot            I10            
                      

 

 2016            INES SHEPPARD, JASON DENSON            Ot            I25.10         
                         

 

 2016            INES SHEPPARD, JASON DENSON            Ot            Z79.899        
                          

 

 2016            INES SHEPPARD, JASON DENSON            Ot            C18.7          
  MALIGNANT NEOPLASM OF SIGMOID COLON                     

 

 2016            INES SHEPPARD, JASON DENSON            Ot            E03.9          
  HYPOTHYROIDISM, UNSPECIFIED                     

 

 2016            INES SHEPPARD, JASON DENSON            Ot            E11.9          
  TYPE 2 DIABETES MELLITUS WITHOUT COMPLIC                     

 

 2016            INES SHEPPARD, JASON DENSON            Ot            E78.0          
  PURE HYPERCHOLESTEROLEMIA                     

 

 2016            INES SHEPPARD, JASON DENSON            Ot            I10            
ESSENTIAL (PRIMARY) HYPERTENSION                     

 

 2016            INES SHEPPARD, JASON DENSON            Ot            I25.10         
   ATHSCL HEART DISEASE OF NATIVE CORONARY                      

 

 2016            INES SHEPPARD, JASON DENSON            Ot            Z79.899        
    OTHER LONG TERM (CURRENT) DRUG THERAPY                     

 

 2016            INES SHEPPARD, JASON DENSON            Ot            C18.7          
  MALIGNANT NEOPLASM OF SIGMOID COLON                     

 

 2016            INES SHEPPARD, JASON DENSON            Ot            E03.9          
  HYPOTHYROIDISM, UNSPECIFIED                     

 

 2016            INES SHEPPARD, JASON DENSON            Ot            E11.9          
  TYPE 2 DIABETES MELLITUS WITHOUT COMPLIC                     

 

 2016            INES SHEPPARD, JASON DENSON            Ot            E78.0          
  PURE HYPERCHOLESTEROLEMIA                     

 

 2016            JASON CHACON MD            Ot            I10            
ESSENTIAL (PRIMARY) HYPERTENSION                     

 

 2016            JASON CHACON MD            Ot            I25.10         
   ATHSCL HEART DISEASE OF NATIVE CORONARY                      

 

 2016            JASON CHACON MD            Ot            Z79.899        
    OTHER LONG TERM (CURRENT) DRUG THERAPY                     

 

 2016            JASON CHACON MD            Ot            C18.7          
  MALIGNANT NEOPLASM OF SIGMOID COLON                     

 

 2016            JASON CHACON MD            Ot            E03.9          
  HYPOTHYROIDISM, UNSPECIFIED                     

 

 2016            JASON CHACON MD            Ot            E11.9          
  TYPE 2 DIABETES MELLITUS WITHOUT COMPLIC                     

 

 2016            JASON CHACON MD            Ot            E78.0          
  PURE HYPERCHOLESTEROLEMIA                     

 

 2016            JASON CHACON MD            Ot            I10            
ESSENTIAL (PRIMARY) HYPERTENSION                     

 

 2016            JASON CHACON MD            Ot            I25.10         
   ATHSCL HEART DISEASE OF NATIVE CORONARY                      

 

 2016            JASON CHACON MD            Ot            Z79.899        
    OTHER LONG TERM (CURRENT) DRUG THERAPY                     

 

 2016                         Ot            401.9            HYPERTENSION 
NOS                     

 

 2016                         Ot            785.2            CARDIAC 
MURMURS NEC                     

 

 2016                         Ot            564.00            UNSPEC 
CONSTIPATION                     

 

 2016                         Ot            578.1            BLOOD IN 
STOOL                     

 

 2016                         Ot            787.91            DIARRHEA   
                  

 

 2016                         Ot            789.00            ABDOMINAL 
PAIN, UNSPECIFIED SITE                     

 

 2016                         Ot            368.9            VISUAL 
DISTURBANCE NOS                     

 

 2016                         Ot            401.9            HYPERTENSION 
NOS                     

 

 2016                         Ot            433.30            MULT 
BILTRAL ARTERY OCCLUSION WO CEREBRA                     

 

 2016                         Ot            785.9            CARDIOVAS 
SYS SYMP NEC                     

 

 2016                         Ot            786.50            CHEST PAIN 
NOS                     

 

 2016            EDUARDO WILSON MD            Ot            719.46    
        JOINT PAIN-L/LEG                     

 

 2016            EDUARDO WILSON MD            Ot            729.5     
       PAIN IN LIMB                     

 

 2016            NATA PERRY MD            Ot            715.36    
        LOC OSTEOARTH NOS-L/LEG                     

 

 2016            NATA PERRY MD            Ot            780.79    
        OTH MALAISE FATIGUE                     

 

 2016            NATA PERRY MD            Ot            791.9     
       ABN URINE FINDINGS NEC                     

 

 2016            NATA PERRY MD            Ot            V72.63    
        PRE-PROCEDURAL LABORATORY EXAMINATION                     

 

 2016            NATA PERRY MD            Ot            V72.83    
        EXAM PRE-OPERATIVE NEC                     

 

 2016            NATA PERRY MD            Ot            V74.8     
       SCREEN-BACTERIAL DIS NEC                     

 

 2016            GAYLA DONOUHE DO            Ot            V72.84      
      EXAM PRE-OPERATIVE NOS                     

 

 2016            NATA PERRY MD            Ot            721.3     
       LUMBOSACRAL SPONDYLOSIS                     

 

 2016            SHRUTI GRIFFIN MD            Ot            272.0       
     PURE HYPERCHOLESTEROLEM                     

 

 2016            SHRUTI GRIFFIN MD            Ot            396.3       
     MITRAL/AORTIC RAHAT INSUFF                     

 

 2016            SHRUTI GRIFFIN MD            Ot            397.0       
     TRICUSPID VALVE DISEASE                     

 

 2016            SHRUTI GRIFFIN MD            Ot            401.9       
     HYPERTENSION NOS                     

 

 2016            SHRUTI GRIFFIN MD            Ot            434.91      
      CEREBRAL ART OCCLUSION NOS W CEREBRAL IN                     

 

 2016            BRONWYN VALENZUELA            Ot            V76.12
            OTH SCREEN MAMMO-MALIGN NEOPLASM OF BEATRICE                     

 

 2016            CLAUDIA DIXON MD            Ot            V72.84        
    EXAM PRE-OPERATIVE NOS                     

 

 2016            JASON CHACON MD            Ot            153.9          
  MALIGNANT DEBBIE COLON NOS                     

 

 2016            CLAUDIA DIXON MD            Ot            V72.84        
    EXAM PRE-OPERATIVE NOS                     

 

 2016            CLAUDIA DIXON MD            Ot            244.9         
   HYPOTHYROIDISM NOS                     

 

 2016            CLAUDIA DIXON MD            Ot            272.0         
   PURE HYPERCHOLESTEROLEM                     

 

 2016            CLAUDIA DIXON MD            Ot            401.9         
   HYPERTENSION NOS                     

 

 2016            CLAUDIA DIXON MD            Ot            443.9         
   PERIPH VASCULAR DIS NOS                     

 

 2016            CLAUDIA DIXON MD            Ot            455.0         
   INT HEMORRHOID W/O COMPL                     

 

 2016            CLAUDIA DIXON MD            Ot            455.3         
   EXT HEMORRHOID W/O COMPL                     

 

 2016            CLAUDIA DIXON MD            Ot            V10.05        
    HX OF COLONIC MALIGNANCY                     

 

 2016            CLAUDIA DIXON MD            Ot            V12.72        
    PERSONAL HISTORY OF COLONIC POLYPS                     

 

 2016            CLAUDIA DIXON MD            Ot            V15.82        
    HISTORY OF TOBACCO USE                     

 

 2016            DRE WRIGHT            Ot            
250.00            DIAB ABIGAIL WO COMPL, TYPE II OR UNSPEC TY                     

 

 2016            DRE WRIGHT            Ot            
272.0            PURE HYPERCHOLESTEROLEM                     

 

 2016            DRE WRIGHT            Ot            
401.9            HYPERTENSION NOS                     

 

 2016            DRE WRIGHT            Ot            
434.91            CEREBRAL ART OCCLUSION NOS W CEREBRAL IN                     

 

 2016            DRE WRIGHT            Ot            
786.50            CHEST PAIN NOS                     

 

 2016            EDUARDO WILSON MD            Ot            786.6     
       CHEST SWELLING/MASS/LUMP                     

 

 2016            EDUARDO WILSON MD            Ot            V76.12    
        OTH SCREEN MAMMO-MALIGN NEOPLASM OF BEATRICE                     

 

 2016            JASON CHACON MD            Ot            C18.7          
  MALIGNANT NEOPLASM OF SIGMOID COLON                     

 

 2016            JASON CHACON MD            Ot            E03.9          
  HYPOTHYROIDISM, UNSPECIFIED                     

 

 2016            JASON CHACON MD            Ot            E11.9          
  TYPE 2 DIABETES MELLITUS WITHOUT COMPLIC                     

 

 2016            JASON CHACON MD            Ot            E78.0          
  PURE HYPERCHOLESTEROLEMIA                     

 

 2016            JASON CHACON MD            Ot            I10            
ESSENTIAL (PRIMARY) HYPERTENSION                     

 

 2016            JASON CHACON MD            Ot            I25.10         
   ATHSCL HEART DISEASE OF NATIVE CORONARY                      

 

 2016            JASON CHACON MD            Ot            Z79.899        
    OTHER LONG TERM (CURRENT) DRUG THERAPY                     

 

 2016            ROSE GUADARRAMA DO            Ot            E03.9        
    HYPOTHYROIDISM, UNSPECIFIED                     

 

 2016            ROSE GUADARRAMA DO            Ot            I10          
  ESSENTIAL (PRIMARY) HYPERTENSION                     

 

 2016            ROSE GUADARRAMA DO            Ot            K21.9        
    GASTRO-ESOPHAGEAL REFLUX DISEASE WITHOUT                     

 

 2016            ROSE GUADARRAMA DO            Ot            K56.5        
    INTESTINAL ADHESIONS W OBST (POSTPROCEDU                     

 

 2016            ROSE GUADARRAMA DO            Ot            K59.00       
     CONSTIPATION, UNSPECIFIED                     

 

 2016            ROSE GUADARRAMA DO            Ot            N28.9        
    DISORDER OF KIDNEY AND URETER, UNSPECIFI                     

 

 2016                         Ot            401.9            HYPERTENSION 
NOS                     

 

 2016                         Ot            785.2            CARDIAC 
MURMURS NEC                     

 

 2016                         Ot            564.00            UNSPEC 
CONSTIPATION                     

 

 2016                         Ot            578.1            BLOOD IN 
STOOL                     

 

 2016                         Ot            787.91            DIARRHEA   
                  

 

 2016                         Ot            789.00            ABDOMINAL 
PAIN, UNSPECIFIED SITE                     

 

 2016                         Ot            368.9            VISUAL 
DISTURBANCE NOS                     

 

 2016                         Ot            401.9            HYPERTENSION 
NOS                     

 

 2016                         Ot            433.30            MULT 
BILTRAL ARTERY OCCLUSION WO CEREBRA                     

 

 2016                         Ot            785.9            CARDIOVAS 
SYS SYMP NEC                     

 

 2016                         Ot            786.50            CHEST PAIN 
NOS                     

 

 2016            KATIE SHEPPARD, EDUARDO PHELAN            Ot            719.46    
        JOINT PAIN-L/LEG                     

 

 2016            EDUARDO WILSON MD            Ot            729.5     
       PAIN IN LIMB                     

 

 2016            NATA PERRY MD            Ot            715.36    
        LOC OSTEOARTH NOS-L/LEG                     

 

 2016            NATA PERRY MD            Ot            780.79    
        OTH MALAISE FATIGUE                     

 

 2016            NATA PERRY MD            Ot            791.9     
       ABN URINE FINDINGS NEC                     

 

 2016            NATA PERRY MD            Ot            V72.63    
        PRE-PROCEDURAL LABORATORY EXAMINATION                     

 

 2016            NATA PERRY MD            Ot            V72.83    
        EXAM PRE-OPERATIVE NEC                     

 

 2016            NATA PERRY MD            Ot            V74.8     
       SCREEN-BACTERIAL DIS NEC                     

 

 2016            GAYLA DONOHUE DO            Ot            V72.84      
      EXAM PRE-OPERATIVE NOS                     

 

 2016            NATA PERRY MD            Ot            721.3     
       LUMBOSACRAL SPONDYLOSIS                     

 

 2016            SHRUTI GRIFFIN MD            Ot            272.0       
     PURE HYPERCHOLESTEROLEM                     

 

 2016            SHRUTI GRIFFIN MD            Ot            396.3       
     MITRAL/AORTIC RAHAT INSUFF                     

 

 2016            SHRUTI GRIFFIN MD            Ot            397.0       
     TRICUSPID VALVE DISEASE                     

 

 2016            SHRUTI GRIFFIN MD            Ot            401.9       
     HYPERTENSION NOS                     

 

 2016            SHRUTI GRIFFIN MD            Ot            434.91      
      CEREBRAL ART OCCLUSION NOS W CEREBRAL IN                     

 

 2016            BRONWYN VALENZUELA            Ot            V76.12
            OTH SCREEN MAMMO-MALIGN NEOPLASM OF BEATRICE                     

 

 2016            CLAUDIA DIXON MD            Ot            V72.84        
    EXAM PRE-OPERATIVE NOS                     

 

 2016            JASON CHACON MD            Ot            153.9          
  MALIGNANT DEBBIE COLON NOS                     

 

 2016            CLAUDIA DIXON MD            Ot            V72.84        
    EXAM PRE-OPERATIVE NOS                     

 

 2016            CLAUDIA DIXON MD            Ot            244.9         
   HYPOTHYROIDISM NOS                     

 

 2016            CLAUDIA DIXON MD            Ot            272.0         
   PURE HYPERCHOLESTEROLEM                     

 

 2016            CLAUDIA DIXON MD            Ot            401.9         
   HYPERTENSION NOS                     

 

 2016            CLAUDIA DIXON MD            Ot            443.9         
   PERIPH VASCULAR DIS NOS                     

 

 2016            CLAUDIA DIXON MD            Ot            455.0         
   INT HEMORRHOID W/O COMPL                     

 

 2016            CLAUDIA DIXON MD            Ot            455.3         
   EXT HEMORRHOID W/O COMPL                     

 

 2016            CLAUDIA DIXON MD            Ot            V10.05        
    HX OF COLONIC MALIGNANCY                     

 

 2016            CLAUDIA DIXON MD            Ot            V12.72        
    PERSONAL HISTORY OF COLONIC POLYPS                     

 

 2016            CLAUDIA DIXON MD            Ot            V15.82        
    HISTORY OF TOBACCO USE                     

 

 2016            DRE WRIGHT            Ot            
250.00            DIAB ABIGAIL WO COMPL, TYPE II OR UNSPEC TY                     

 

 2016            DRE WRIGHT            Ot            
272.0            PURE HYPERCHOLESTEROLEM                     

 

 2016            DRE WRIGHT            Ot            
401.9            HYPERTENSION NOS                     

 

 2016            DRE WRIGHT            Ot            
434.91            CEREBRAL ART OCCLUSION NOS W CEREBRAL IN                     

 

 2016            DRE WRIGHT            Ot            
786.50            CHEST PAIN NOS                     

 

 2016            EDUARDO WILSON MD            Ot            786.6     
       CHEST SWELLING/MASS/LUMP                     

 

 2016            EDUARDO WILSON MD            Ot            V76.12    
        OTH SCREEN MAMMO-MALIGN NEOPLASM OF BEATRICE                     

 

 2016            JASON CHACON MD            Ot            C18.7          
  MALIGNANT NEOPLASM OF SIGMOID COLON                     

 

 2016            JASON CHACON MD            Ot            E03.9          
  HYPOTHYROIDISM, UNSPECIFIED                     

 

 2016            JASON CHACON MD            Ot            E11.9          
  TYPE 2 DIABETES MELLITUS WITHOUT COMPLIC                     

 

 2016            JASON CHACON MD            Ot            E78.0          
  PURE HYPERCHOLESTEROLEMIA                     

 

 2016            JASON CHACON MD            Ot            I10            
ESSENTIAL (PRIMARY) HYPERTENSION                     

 

 2016            JASON CHACON MD            Ot            I25.10         
   ATHSCL HEART DISEASE OF NATIVE CORONARY                      

 

 2016            JASON CHACON MD            Ot            Z79.899        
    OTHER LONG TERM (CURRENT) DRUG THERAPY                     

 

 2016            JASON CHACON MD            Ot            C18.7          
  MALIGNANT NEOPLASM OF SIGMOID COLON                     

 

 2016            JASON CHACON MD            Ot            E03.9          
  HYPOTHYROIDISM, UNSPECIFIED                     

 

 2016            JASON CHACON MD            Ot            E11.9          
  TYPE 2 DIABETES MELLITUS WITHOUT COMPLIC                     

 

 2016            JASON CHACON MD            Ot            E78.0          
  PURE HYPERCHOLESTEROLEMIA                     

 

 2016            JASON CHACON MD            Ot            I10            
ESSENTIAL (PRIMARY) HYPERTENSION                     

 

 2016            JASON CHACON MD            Ot            I25.10         
   ATHSCL HEART DISEASE OF NATIVE CORONARY                      

 

 2016            JASON CHACON MD            Ot            Z79.899        
    OTHER LONG TERM (CURRENT) DRUG THERAPY                     

 

 2016                         Ot            401.9            HYPERTENSION 
NOS                     

 

 2016                         Ot            785.2            CARDIAC 
MURMURS NEC                     

 

 2016                         Ot            564.00            UNSPEC 
CONSTIPATION                     

 

 2016                         Ot            578.1            BLOOD IN 
STOOL                     

 

 2016                         Ot            787.91            DIARRHEA   
                  

 

 2016                         Ot            789.00            ABDOMINAL 
PAIN, UNSPECIFIED SITE                     

 

 2016                         Ot            368.9            VISUAL 
DISTURBANCE NOS                     

 

 2016                         Ot            401.9            HYPERTENSION 
NOS                     

 

 2016                         Ot            433.30            MULT 
BILTRAL ARTERY OCCLUSION WO CEREBRA                     

 

 2016                         Ot            785.9            CARDIOVAS 
SYS SYMP NEC                     

 

 2016                         Ot            786.50            CHEST PAIN 
NOS                     

 

 2016            EDUARDO WILSON MD            Ot            719.46    
        JOINT PAIN-L/LEG                     

 

 2016            EDUARDO WILSON MD            Ot            729.5     
       PAIN IN LIMB                     

 

 2016            NATA PERRY MD            Ot            715.36    
        LOC OSTEOARTH NOS-L/LEG                     

 

 2016            NATA PERRY MD            Ot            780.79    
        OTH MALAISE FATIGUE                     

 

 2016            NATA PERRY MD            Ot            791.9     
       ABN URINE FINDINGS NEC                     

 

 2016            NATA PERRY MD            Ot            V72.63    
        PRE-PROCEDURAL LABORATORY EXAMINATION                     

 

 2016            NATA PERRY MD            Ot            V72.83    
        EXAM PRE-OPERATIVE NEC                     

 

 2016            NATA PERRY MD            Ot            V74.8     
       SCREEN-BACTERIAL DIS NEC                     

 

 2016            GAYLA DONOHUE DO            Ot            V72.84      
      EXAM PRE-OPERATIVE NOS                     

 

 2016            NATA PERRY MD            Ot            721.3     
       LUMBOSACRAL SPONDYLOSIS                     

 

 2016            SHRUTI GRIFFIN MD            Ot            272.0       
     PURE HYPERCHOLESTEROLEM                     

 

 2016            SHRUTI GRIFFIN MD            Ot            396.3       
     MITRAL/AORTIC RAHAT INSUFF                     

 

 2016            SHRUTI GRIFFIN MD            Ot            397.0       
     TRICUSPID VALVE DISEASE                     

 

 2016            SHRUTI GRIFFIN MD            Ot            401.9       
     HYPERTENSION NOS                     

 

 2016            SHRUTI GRIFFIN MD            Ot            434.91      
      CEREBRAL ART OCCLUSION NOS W CEREBRAL IN                     

 

 2016            BIANCABRONWYN ARNGARCÍA            Ot            V76.12
            OTH SCREEN MAMMO-MALIGN NEOPLASM OF BEATRICE                     

 

 2016            CLAUDIA DIXON MD            Ot            V72.84        
    EXAM PRE-OPERATIVE NOS                     

 

 2016            JASON CHACON MD            Ot            153.9          
  MALIGNANT DEBBIE COLON NOS                     

 

 2016            CLAUDIA DIXON MD, Ot            V72.84        
    EXAM PRE-OPERATIVE NOS                     

 

 2016            CLAUDIA DIXON MD            Ot            244.9         
   HYPOTHYROIDISM NOS                     

 

 2016            CLAUDIA DIXON MD            Ot            272.0         
   PURE HYPERCHOLESTEROLEM                     

 

 2016            CLAUDIA DIXON MD            Ot            401.9         
   HYPERTENSION NOS                     

 

 2016            CLAUDIA DIXON MD            Ot            443.9         
   PERIPH VASCULAR DIS NOS                     

 

 2016            CLAUDIA DIXON MD            Ot            455.0         
   INT HEMORRHOID W/O COMPL                     

 

 2016            CLAUDIA DIXON MD            Ot            455.3         
   EXT HEMORRHOID W/O COMPL                     

 

 2016            CLAUDIA DIXON MD            Ot            V10.05        
    HX OF COLONIC MALIGNANCY                     

 

 2016            CLAUDIA DIXON MD            Ot            V12.72        
    PERSONAL HISTORY OF COLONIC POLYPS                     

 

 2016            CLAUDIA DIXON MD            Ot            V15.82        
    HISTORY OF TOBACCO USE                     

 

 2016            DRE WRIGHT            Ot            
250.00            DIAB ABIGAIL WO COMPL, TYPE II OR UNSPEC TY                     

 

 2016            DRE WRIGHT            Ot            
272.0            PURE HYPERCHOLESTEROLEM                     

 

 2016            DRE WRIGHT            Ot            
401.9            HYPERTENSION NOS                     

 

 2016            DRE WRIGHT            Ot            
434.91            CEREBRAL ART OCCLUSION NOS W CEREBRAL IN                     

 

 2016            DRE WRIGHT            Ot            
786.50            CHEST PAIN NOS                     

 

 2016            EDUARDO WILSON MD            Ot            786.6     
       CHEST SWELLING/MASS/LUMP                     

 

 2016            EDUARDO WILSON MD, Ot            V76.12    
        OTH SCREEN MAMMO-MALIGN NEOPLASM OF BEATRICE                     

 

 2016            JASON CHACON MD            Ot            C18.7          
  MALIGNANT NEOPLASM OF SIGMOID COLON                     

 

 2016            JASON CHACON MD            Ot            E03.9          
  HYPOTHYROIDISM, UNSPECIFIED                     

 

 2016            JASON CHACON MD            Ot            E11.9          
  TYPE 2 DIABETES MELLITUS WITHOUT COMPLIC                     

 

 2016            JASON CHACON MD            Ot            E78.0          
  PURE HYPERCHOLESTEROLEMIA                     

 

 2016            JASON CHACON MD            Ot            I10            
ESSENTIAL (PRIMARY) HYPERTENSION                     

 

 2016            JASON CHACON MD            Ot            I25.10         
   ATHSCL HEART DISEASE OF NATIVE CORONARY                      

 

 2016            JASON CHACON MD            Ot            Z79.899        
    OTHER LONG TERM (CURRENT) DRUG THERAPY                     

 

 2016            BRONWYN VALENZUELA            Ot            
M25.511            PAIN IN RIGHT SHOULDER                     

 

 2016            EDUARDO WILSON MD, Ot            M25.511   
         PAIN IN RIGHT SHOULDER                     

 

 2016            JASON CHACON MD            Ot            C18.7          
  MALIGNANT NEOPLASM OF SIGMOID COLON                     

 

 2016            JASON CHACON MD            Ot            E03.9          
  HYPOTHYROIDISM, UNSPECIFIED                     

 

 2016            JASON CHACON MD            Ot            E11.9          
  TYPE 2 DIABETES MELLITUS WITHOUT COMPLIC                     

 

 2016            JASON CHACON MD            Ot            E78.0          
  PURE HYPERCHOLESTEROLEMIA                     

 

 2016            JASON CHACON MD            Ot            I10            
ESSENTIAL (PRIMARY) HYPERTENSION                     

 

 2016            JASON CHACON MD            Ot            I25.10         
   ATHSCL HEART DISEASE OF NATIVE CORONARY                      

 

 2016            JASON CHACON MD            Ot            Z79.899        
    OTHER LONG TERM (CURRENT) DRUG THERAPY                     

 

 2016            BRONWYN VALENZUELA            Ot            
M25.511            PAIN IN RIGHT SHOULDER                     

 

 2016            EDUARDO WILSON MD            Ot            M25.511   
         PAIN IN RIGHT SHOULDER                     

 

 2016            BRONWYN VALENZUELAP            Ot            
M25.511            PAIN IN RIGHT SHOULDER                     

 

 2016            JASON CHACON MD            Ot            C18.7          
  MALIGNANT NEOPLASM OF SIGMOID COLON                     

 

 2016            JASON CHACON MD            Ot            E03.9          
  HYPOTHYROIDISM, UNSPECIFIED                     

 

 2016            JASON CHACON MD            Ot            E11.9          
  TYPE 2 DIABETES MELLITUS WITHOUT COMPLIC                     

 

 2016            JASON CHACON MD            Ot            E78.0          
  PURE HYPERCHOLESTEROLEMIA                     

 

 2016            JASON CHACON MD            Ot            I10            
ESSENTIAL (PRIMARY) HYPERTENSION                     

 

 2016            INES SHEPPARD, JASON DENSON            Ot            I25.10         
   ATHSCL HEART DISEASE OF NATIVE CORONARY                      

 

 2016            INES SHEPPARD, JASON JARETT            Ot            Z79.899        
    OTHER LONG TERM (CURRENT) DRUG THERAPY                     

 

 2017                         Ot            564.00            UNSPEC 
CONSTIPATION                     

 

 2017                         Ot            578.1            BLOOD IN 
STOOL                     

 

 2017                         Ot            787.91            DIARRHEA   
                  

 

 2017                         Ot            789.00            ABDOMINAL 
PAIN, UNSPECIFIED SITE                     

 

 2017                         Ot            368.9            VISUAL 
DISTURBANCE NOS                     

 

 2017                         Ot            401.9            HYPERTENSION 
NOS                     

 

 2017                         Ot            433.30            MULT 
BILTRAL ARTERY OCCLUSION WO CEREBRA                     

 

 2017                         Ot            785.9            CARDIOVAS 
SYS SYMP NEC                     

 

 2017                         Ot            786.50            CHEST PAIN 
NOS                     

 

 2017            EDUARDO WILSON MD            Ot            719.46    
        JOINT PAIN-L/LEG                     

 

 2017            EDUARDO WILSON MD            Ot            729.5     
       PAIN IN LIMB                     

 

 2017            NATA PERRY MD            Ot            715.36    
        LOC OSTEOARTH NOS-L/LEG                     

 

 2017            NATA PERRY MD            Ot            780.79    
        OTH MALAISE FATIGUE                     

 

 2017            NATA PERRY MD            Ot            791.9     
       ABN URINE FINDINGS NEC                     

 

 2017            NATA PERRY MD            Ot            V72.63    
        PRE-PROCEDURAL LABORATORY EXAMINATION                     

 

 2017            NATA PERRY MD            Ot            V72.83    
        EXAM PRE-OPERATIVE NEC                     

 

 2017            NATA PERRY MD            Ot            V74.8     
       SCREEN-BACTERIAL DIS NEC                     

 

 2017            GAYLA DONOHUE DO            Ot            V72.84      
      EXAM PRE-OPERATIVE NOS                     

 

 2017            NATA PERRY MD            Ot            721.3     
       LUMBOSACRAL SPONDYLOSIS                     

 

 2017            SHRUTI GRIFFIN MD            Ot            272.0       
     PURE HYPERCHOLESTEROLEM                     

 

 2017            SHRUTI GRIFFIN MD            Ot            396.3       
     MITRAL/AORTIC RAHAT INSUFF                     

 

 2017            SHRUTI GRIFFIN MD            Ot            397.0       
     TRICUSPID VALVE DISEASE                     

 

 2017            SHRUTI GRIFFIN MD            Ot            401.9       
     HYPERTENSION NOS                     

 

 2017            SHRUTI GRIFFIN MD            Ot            434.91      
      CEREBRAL ART OCCLUSION NOS W CEREBRAL IN                     

 

 2017            BRONWYN VALENZUELA            Ot            V76.12
            OTH SCREEN MAMMO-MALIGN NEOPLASM OF BEATRICE                     

 

 2017            CLAUDIA DIXON MD            Ot            V72.84        
    EXAM PRE-OPERATIVE NOS                     

 

 2017            JASON CHACON MD            Ot            153.9          
  MALIGNANT DEBBIE COLON NOS                     

 

 2017            CLAUDIA DIXON MD            Ot            V72.84        
    EXAM PRE-OPERATIVE NOS                     

 

 2017            CLAUDIA DIXON MD            Ot            244.9         
   HYPOTHYROIDISM NOS                     

 

 2017            CLAUDIA DIXON MD            Ot            272.0         
   PURE HYPERCHOLESTEROLEM                     

 

 2017            CLAUDIA DIXON MD            Ot            401.9         
   HYPERTENSION NOS                     

 

 2017            CLAUDIA DIXON MD            Ot            443.9         
   PERIPH VASCULAR DIS NOS                     

 

 2017            CLAUDIA DIXON MD            Ot            455.0         
   INT HEMORRHOID W/O COMPL                     

 

 2017            CLAUDIA DIXON MD            Ot            455.3         
   EXT HEMORRHOID W/O COMPL                     

 

 2017            CLAUDIA DIXON MD            Ot            V10.05        
    HX OF COLONIC MALIGNANCY                     

 

 2017            CLAUDIA DIXON MD            Ot            V12.72        
    PERSONAL HISTORY OF COLONIC POLYPS                     

 

 2017            CLAUDIA DIXON MD            Ot            V15.82        
    HISTORY OF TOBACCO USE                     

 

 2017            DRE WRIGHT            Ot            
250.00            DIAB ABIGAIL WO COMPL, TYPE II OR UNSPEC TY                     

 

 2017            DRE WRIGHT            Ot            
272.0            PURE HYPERCHOLESTEROLEM                     

 

 2017            DRE WRIGHT            Ot            
401.9            HYPERTENSION NOS                     

 

 2017            DRE WRIGHT            Ot            
434.91            CEREBRAL ART OCCLUSION NOS W CEREBRAL IN                     

 

 2017            DRE WRIGHT            Ot            
786.50            CHEST PAIN NOS                     

 

 2017            EDUARDO WILSON MD            Ot            786.6     
       CHEST SWELLING/MASS/LUMP                     

 

 2017            EDUARDO WILSON MD            Ot            V76.12    
        OTH SCREEN MAMMO-MALIGN NEOPLASM OF BEATRICE                     

 

 2017            BRONWYN VALENZUELA            Ot            
M25.511            PAIN IN RIGHT SHOULDER                     

 

 2017            JASON CHACON MD            Ot            C18.7          
  MALIGNANT NEOPLASM OF SIGMOID COLON                     

 

 2017            JASON CHACON MD            Ot            E03.9          
  HYPOTHYROIDISM, UNSPECIFIED                     

 

 2017            JASON CHACON MD            Ot            E11.9          
  TYPE 2 DIABETES MELLITUS WITHOUT COMPLIC                     

 

 2017            JASON CHACON MD            Ot            E78.0          
  PURE HYPERCHOLESTEROLEMIA * DO NOT USE *                     

 

 2017            JASON CHACON MD            Ot            I10            
ESSENTIAL (PRIMARY) HYPERTENSION                     

 

 2017            JASON CHACON MD            Ot            I25.10         
   ATHSCL HEART DISEASE OF NATIVE CORONARY                      

 

 2017            JASON CHACON MD            Ot            Z79.899        
    OTHER LONG TERM (CURRENT) DRUG THERAPY                     

 

 2017            JASON CHACON MD            Ot            C18.7          
  MALIGNANT NEOPLASM OF SIGMOID COLON                     

 

 2017            JASON CHACON MD            Ot            E03.9          
  HYPOTHYROIDISM, UNSPECIFIED                     

 

 2017            JASON CHACON MD            Ot            E11.9          
  TYPE 2 DIABETES MELLITUS WITHOUT COMPLIC                     

 

 2017            JASON CHACON MD            Ot            E78.0          
  PURE HYPERCHOLESTEROLEMIA * DO NOT USE *                     

 

 2017            JASON CHACON MD            Ot            I10            
ESSENTIAL (PRIMARY) HYPERTENSION                     

 

 2017            INES SHEPPARD JASON JARETT            Ot            I25.10         
   ATHSCL HEART DISEASE OF NATIVE CORONARY                      

 

 2017            INES SHEPPARD JASON JARETT            Ot            Z79.899        
    OTHER LONG TERM (CURRENT) DRUG THERAPY                     

 

 2017                         Ot            564.00            UNSPEC 
CONSTIPATION                     

 

 2017                         Ot            578.1            BLOOD IN 
STOOL                     

 

 2017                         Ot            787.91            DIARRHEA   
                  

 

 2017                         Ot            789.00            ABDOMINAL 
PAIN, UNSPECIFIED SITE                     

 

 2017                         Ot            368.9            VISUAL 
DISTURBANCE NOS                     

 

 2017                         Ot            401.9            HYPERTENSION 
NOS                     

 

 2017                         Ot            433.30            MULT 
BILTRAL ARTERY OCCLUSION WO CEREBRA                     

 

 2017                         Ot            785.9            CARDIOVAS 
SYS SYMP NEC                     

 

 2017                         Ot            786.50            CHEST PAIN 
NOS                     

 

 2017            EDUARDO WILSON MD            Ot            719.46    
        JOINT PAIN-L/LEG                     

 

 2017            EDUARDO WILSON MD            Ot            729.5     
       PAIN IN LIMB                     

 

 2017            NATA PERRY MD            Ot            715.36    
        LOC OSTEOARTH NOS-L/LEG                     

 

 2017            NATA PERRY MD            Ot            780.79    
        OTH MALAISE FATIGUE                     

 

 2017            NATA PERRY MD            Ot            791.9     
       ABN URINE FINDINGS NEC                     

 

 2017            NATA PERRY MD            Ot            V72.63    
        PRE-PROCEDURAL LABORATORY EXAMINATION                     

 

 2017            NATA PERRY MD            Ot            V72.83    
        EXAM PRE-OPERATIVE NEC                     

 

 2017            NATA PERRY MD            Ot            V74.8     
       SCREEN-BACTERIAL DIS NEC                     

 

 2017            GAYLA DONOHUE DO            Ot            V72.84      
      EXAM PRE-OPERATIVE NOS                     

 

 2017            NATA PERRY MD            Ot            721.3     
       LUMBOSACRAL SPONDYLOSIS                     

 

 2017            SHRUTI GRIFFIN MD            Ot            272.0       
     PURE HYPERCHOLESTEROLEM                     

 

 2017            SHRUTI GRIFFIN MD            Ot            396.3       
     MITRAL/AORTIC RAHAT INSUFF                     

 

 2017            SHRUTI GRIFFIN MD            Ot            397.0       
     TRICUSPID VALVE DISEASE                     

 

 2017            SHRUTI GRIFFIN MD            Ot            401.9       
     HYPERTENSION NOS                     

 

 2017            SHRUTI GRIFFIN MD            Ot            434.91      
      CEREBRAL ART OCCLUSION NOS W CEREBRAL IN                     

 

 2017            BRONWYN VALENZUELA            Ot            V76.12
            OTH SCREEN MAMMO-MALIGN NEOPLASM OF BEATRICE                     

 

 2017            CLAUDIA DIXON MD, Ot            V72.84        
    EXAM PRE-OPERATIVE NOS                     

 

 2017            JASON CHACON MD            Ot            153.9          
  MALIGNANT DEBBIE COLON NOS                     

 

 2017            CLAUDIA DIXON MD, Ot            V72.84        
    EXAM PRE-OPERATIVE NOS                     

 

 2017            CLAUDIA DIXON MD            Ot            244.9         
   HYPOTHYROIDISM NOS                     

 

 2017            CLAUDIA DIXON MD            Ot            272.0         
   PURE HYPERCHOLESTEROLEM                     

 

 2017            CLAUDIA DIXON MD            Ot            401.9         
   HYPERTENSION NOS                     

 

 2017            CLAUDIA DIXON MD            Ot            443.9         
   PERIPH VASCULAR DIS NOS                     

 

 2017            CLAUDIA DIXON MD            Ot            455.0         
   INT HEMORRHOID W/O COMPL                     

 

 2017            CLAUDIA DIXON MD            Ot            455.3         
   EXT HEMORRHOID W/O COMPL                     

 

 2017            CLAUDIA DIXON MD            Ot            V10.05        
    HX OF COLONIC MALIGNANCY                     

 

 2017            CLAUDIA DIXON MD            Ot            V12.72        
    PERSONAL HISTORY OF COLONIC POLYPS                     

 

 2017            CLAUDIA DIXON MD            Ot            V15.82        
    HISTORY OF TOBACCO USE                     

 

 2017            DRE WRIGHT            Ot            
250.00            DIAB ABIGAIL WO COMPL, TYPE II OR UNSPEC TY                     

 

 2017            DRE WRIGHT            Ot            
272.0            PURE HYPERCHOLESTEROLEM                     

 

 2017            DRE WRIGHT            Ot            
401.9            HYPERTENSION NOS                     

 

 2017            DRE WRIGHT            Ot            
434.91            CEREBRAL ART OCCLUSION NOS W CEREBRAL IN                     

 

 2017            DRE WRIGHT            Ot            
786.50            CHEST PAIN NOS                     

 

 2017            EDUARDO WILSON MD            Ot            786.6     
       CHEST SWELLING/MASS/LUMP                     

 

 2017            EDUARDO WILSON MD            Ot            V76.12    
        OTH SCREEN MAMMO-MALIGN NEOPLASM OF BEATRICE                     

 

 2017            BRONWYN VALENZUELA            Ot            
M25.511            PAIN IN RIGHT SHOULDER                     

 

 2017            JASON CHACON MD            Ot            C18.7          
  MALIGNANT NEOPLASM OF SIGMOID COLON                     

 

 2017            JASON CHACON MD            Ot            E03.9          
  HYPOTHYROIDISM, UNSPECIFIED                     

 

 2017            JASON CHACON MD            Ot            E11.9          
  TYPE 2 DIABETES MELLITUS WITHOUT COMPLIC                     

 

 2017            JASON CHACON MD            Ot            E78.00         
   PURE HYPERCHOLESTEROLEMIA, UNSPECIFIED                     

 

 2017            JASON CHACON MD            Ot            I10            
ESSENTIAL (PRIMARY) HYPERTENSION                     

 

 2017            JASON CHACON MD            Ot            I25.10         
   ATHSCL HEART DISEASE OF NATIVE CORONARY                      

 

 2017            INES SHEPPARD, JASON DENSON            Ot            Z79.899        
    OTHER LONG TERM (CURRENT) DRUG THERAPY                     

 

 2017            BRONWYN VALENZUELAP            Ot            R05   
         COUGH                     

 

 2017            BRONWYN VALENZUELAP            Ot            R07.81
            PLEURODYNIA                     

 

 2017            BRONWYN VALENZUELAP            Ot            Z91.81
            HISTORY OF FALLING                     

 

 2017            BRONWYN VALENZUELAP            Ot            Z99.81
            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                     

 

 2017            BRONWYN VALENZUELA ARNP            Ot            R05   
         COUGH                     

 

 2017            BRONWYN VALENZUELAP            Ot            R07.81
            PLEURODYNIA                     

 

 2017            BRONWYN VALENZUELAP            Ot            Z91.81
            HISTORY OF FALLING                     

 

 2017            BRONWYN VALENZUELAP            Ot            Z99.81
            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                     

 

 2017            VALENZUELA, BRONWYN M ARNP            Ot            R05   
         COUGH                     

 

 2017            BIANCABRONWYN ARNP            Ot            R07.81
            PLEURODYNIA                     

 

 2017            BIANCABRONWYN ARNP            Ot            Z91.81
            HISTORY OF FALLING                     

 

 2017            BIANCABRONWYN MAYAP            Ot            Z99.81
            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                     

 

 2017            JASON CHACON MD            Ot            C18.7          
  MALIGNANT NEOPLASM OF SIGMOID COLON                     

 

 2017            JASON CHACON MD            Ot            E03.9          
  HYPOTHYROIDISM, UNSPECIFIED                     

 

 2017            JASON CHACON MD            Ot            E11.9          
  TYPE 2 DIABETES MELLITUS WITHOUT COMPLIC                     

 

 2017            JASON CHACON MD            Ot            E78.00         
   PURE HYPERCHOLESTEROLEMIA, UNSPECIFIED                     

 

 2017            JASON CHACON MD            Ot            I10            
ESSENTIAL (PRIMARY) HYPERTENSION                     

 

 2017            JASON CHACON MD            Ot            I25.10         
   ATHSCL HEART DISEASE OF NATIVE CORONARY                      

 

 2017            JASON CHACON MD            Ot            Z79.899        
    OTHER LONG TERM (CURRENT) DRUG THERAPY                     

 

 2017            YAZMIN CHAIREZ MD            Ot            Z12.31       
     ENCNTR SCREEN MAMMOGRAM FOR MALIGNANT NE                     

 

 2017            YAZMIN CHAIREZ MD            Ot            Z12.31       
     ENCNTR SCREEN MAMMOGRAM FOR MALIGNANT NE                     

 

 2017            EVELIO BARTHOLOMEW            Ot            S42.351A 
           DISPLACED COMMINUTED FX SHAFT OF HUMERUS                     

 

 2017            EVELIO BARTHOLOMEW            Ot            S49.91XA 
           UNSP INJURY OF RIGHT SHOULDER AND UPPER                      

 

 2017            EVELIO BARTHOLOMEW            Ot            W01.0XXA 
           FALL SAME LEV FROM SLIP/TRIP W/O STRIKE                      

 

 2017            EVELIO BARTHOLOMEW            Ot            Y92.512  
          SUPERMARKET, STORE OR MARKET AS PLACE                     

 

 2017            EVELIO BARTHOLOMEW            Ot            Y99.8    
        OTHER EXTERNAL CAUSE STATUS                     

 

 2017            EVELIO BARTHOLOMEW            Ot            Z79.82   
         LONG TERM (CURRENT) USE OF ASPIRIN                     

 

 2017            EVELIO BARTHOLOMEW            Ot            Z79.899  
          OTHER LONG TERM (CURRENT) DRUG THERAPY                     

 

 2017            YAZMIN CHAIREZ MD, Ot            Z12.31       
     ENCNTR SCREEN MAMMOGRAM FOR MALIGNANT NE                     

 

 2017            JASON CHACON MD            Ot            C18.7          
  MALIGNANT NEOPLASM OF SIGMOID COLON                     

 

 2017            JASON CHACON MD            Ot            E03.9          
  HYPOTHYROIDISM, UNSPECIFIED                     

 

 2017            JASON CHACON MD            Ot            E11.9          
  TYPE 2 DIABETES MELLITUS WITHOUT COMPLIC                     

 

 2017            JASON CHACON MD            Ot            E78.00         
   PURE HYPERCHOLESTEROLEMIA, UNSPECIFIED                     

 

 2017            JASON CHACON MD            Ot            I10            
ESSENTIAL (PRIMARY) HYPERTENSION                     

 

 2017            JASON CHACON MD            Ot            I25.10         
   ATHSCL HEART DISEASE OF NATIVE CORONARY                      

 

 2017            JASON CHACON MD            Ot            Z79.899        
    OTHER LONG TERM (CURRENT) DRUG THERAPY                     

 

 2018            DOMENIC VANCE            Ot            S29.9XXA 
           UNSPECIFIED INJURY OF THORAX, INITIAL EN                     

 

 2018            DOMENIC VANCE            Ot            W19.XXXA 
           UNSPECIFIED FALL, INITIAL ENCOUNTER                     

 

 2018            EDUARDO WILSON MD            Ot            719.46    
        JOINT PAIN-L/LEG                     

 

 2018            EDUARDO WILSON MD            Ot            729.5     
       PAIN IN LIMB                     

 

 2018            NATA PERRY MD            Ot            715.36    
        LOC OSTEOARTH NOS-L/LEG                     

 

 2018            NATA PERRY MD            Ot            780.79    
        OTH MALAISE FATIGUE                     

 

 2018            NATA PERRY MD            Ot            791.9     
       ABN URINE FINDINGS NEC                     

 

 2018            NATA PERRY MD            Ot            V72.63    
        PRE-PROCEDURAL LABORATORY EXAMINATION                     

 

 2018            NATA PERRY MD            Ot            V72.83    
        EXAM PRE-OPERATIVE NEC                     

 

 2018            NATA PERRY MD            Ot            V74.8     
       SCREEN-BACTERIAL DIS NEC                     

 

 2018            GAYLA DONOHUE DO            Ot            V72.84      
      EXAM PRE-OPERATIVE NOS                     

 

 2018            NATA PERRY MD            Ot            721.3     
       LUMBOSACRAL SPONDYLOSIS                     

 

 2018            SHRUTI GRIFFIN MD            Ot            272.0       
     PURE HYPERCHOLESTEROLEM                     

 

 2018            SHRUTI GRIFFIN MD            Ot            396.3       
     MITRAL/AORTIC RAHAT INSUFF                     

 

 2018            SHRUTI GRIFFIN MD            Ot            397.0       
     TRICUSPID VALVE DISEASE                     

 

 2018            SHRUTI GRIFFIN MD            Ot            401.9       
     HYPERTENSION NOS                     

 

 2018            SHRUTI GRIFFIN MD            Ot            434.91      
      CEREBRAL ART OCCLUSION NOS W CEREBRAL IN                     

 

 2018            BRONWYN VALENZUELA            Ot            V76.12
            OTH SCREEN MAMMO-MALIGN NEOPLASM OF BEATRICE                     

 

 2018            CLAUDIA DIXON MD            Ot            V72.84        
    EXAM PRE-OPERATIVE NOS                     

 

 2018            JASON CHACON MD            Ot            153.9          
  MALIGNANT DEBBIE COLON NOS                     

 

 2018            CLAUDIA DIXON MD            Ot            V72.84        
    EXAM PRE-OPERATIVE NOS                     

 

 2018            CLAUDIA DIXON MD            Ot            244.9         
   HYPOTHYROIDISM NOS                     

 

 2018            CLAUDIA DIXON MD            Ot            272.0         
   PURE HYPERCHOLESTEROLEM                     

 

 2018            CLAUDIA DIXON MD            Ot            401.9         
   HYPERTENSION NOS                     

 

 2018            CLAUDIA DIXON MD            Ot            443.9         
   PERIPH VASCULAR DIS NOS                     

 

 2018            CLAUDIA DIXON MD            Ot            455.0         
   INT HEMORRHOID W/O COMPL                     

 

 2018            CLAUDIA DIXON MD            Ot            455.3         
   EXT HEMORRHOID W/O COMPL                     

 

 2018            CLAUDIA DIXON MD            Ot            V10.05        
    HX OF COLONIC MALIGNANCY                     

 

 2018            CLAUDIA DIXON MD            Ot            V12.72        
    PERSONAL HISTORY OF COLONIC POLYPS                     

 

 2018            CLAUDIA DIXON MD            Ot            V15.82        
    HISTORY OF TOBACCO USE                     

 

 2018            DRE WRIGHT            Ot            
250.00            DIAB ABIGAIL WO COMPL, TYPE II OR UNSPEC TY                     

 

 2018            DRE WRIGHT            Ot            
272.0            PURE HYPERCHOLESTEROLEM                     

 

 2018            DRE WRIGHT            Ot            
401.9            HYPERTENSION NOS                     

 

 2018            DRE WRIGHT            Ot            
434.91            CEREBRAL ART OCCLUSION NOS W CEREBRAL IN                     

 

 2018            DRE WRIGHT            Ot            
786.50            CHEST PAIN NOS                     

 

 2018            EDUARDO WILSON MD            Ot            786.6     
       CHEST SWELLING/MASS/LUMP                     

 

 2018            EDUARDO WILSON MD            Ot            V76.12    
        OTH SCREEN MAMMO-MALIGN NEOPLASM OF BEATRICE                     

 

 2018            BRONWYN VALENZUELA            Ot            
M25.511            PAIN IN RIGHT SHOULDER                     

 

 2018            BRONWYN VALENZUELAP            Ot            R05   
         COUGH                     

 

 2018            BRONWYN VALENZUELAP            Ot            R07.81
            PLEURODYNIA                     

 

 2018            BRONWYN VALENZUELA            Ot            Z91.81
            HISTORY OF FALLING                     

 

 2018            BIANCAPOOJABRONWYN BELLE ROSANNA            Ot            Z99.81
            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                     

 

 2018            CONTRERAS SHEPPARD, YAZMIN ODONNELL            Ot            Z12.31       
     ENCNTR SCREEN MAMMOGRAM FOR MALIGNANT NE                     

 

 2018            DOMENIC VANCE            Ot            S29.9XXA 
           UNSPECIFIED INJURY OF THORAX, INITIAL EN                     

 

 2018            DOMENIC VANCE            Ot            W19.XXXA 
           UNSPECIFIED FALL, INITIAL ENCOUNTER                     



                                                                               
                                                                               
                                                                               
                                                                               
                                                                               
                                                                               
                                                                               
                                                                               
                                                                               
                                                                               
                                                                               
                                                                               
                                                                               
                                                                               
                                                                               
                                                                               
  



Procedures

      





 Code            Description            Performed By            Performed On   
     

 

             45.16                                  ESOPHAGOGASTRODUODENOSCOPY [
EGD] W/CLOSE                                   2011        

 

             45.42                                  ENDOSC POLYPECTOMY OF LG 
INTEST                                   2011        

 

             81.54                                  TOTAL KNEE REPLACEMENT     
                              2013        



                      



Results

      





 Test            Result            Range        









 Complete blood count (CBC) with automated white blood cell (WBC) differential 
- 18 05:54         









 Blood leukocytes automated count (number/volume)            11.5 10*3/uL      
      4.3-11.0        

 

 Blood erythrocytes automated count (number/volume)            4.15 10*6/uL    
        4.35-5.85        

 

 Venous blood hemoglobin measurement (mass/volume)            13.5 g/dL        
    11.5-16.0        

 

 Blood hematocrit (volume fraction)            40 %            35-52        

 

 Automated erythrocyte mean corpuscular volume            96 [foz_us]          
  80-99        

 

 Automated erythrocyte mean corpuscular hemoglobin (mass per erythrocyte)      
      33 pg            25-34        

 

 Automated erythrocyte mean corpuscular hemoglobin concentration measurement (
mass/volume)            34 g/dL            32-36        

 

 Automated erythrocyte distribution width ratio            14.1 %            
10.0-14.5        

 

 Automated blood platelet count (count/volume)            173 10*3/uL          
  130-400        

 

 Automated blood platelet mean volume measurement            10.4 [foz_us]     
       7.4-10.4        

 

 Automated blood neutrophils/100 leukocytes            76 %            42-75   
     

 

 Automated blood lymphocytes/100 leukocytes            13 %            12-44   
     

 

 Blood monocytes/100 leukocytes            7 %            0-12        

 

 Automated blood eosinophils/100 leukocytes            4 %            0-10     
   

 

 Automated blood basophils/100 leukocytes            1 %            0-10        

 

 Blood neutrophils automated count (number/volume)            8.8 10*3         
   1.8-7.8        

 

 Blood lymphocytes automated count (number/volume)            1.5 10*3         
   1.0-4.0        

 

 Blood monocytes automated count (number/volume)            0.8 10*3            
0.0-1.0        

 

 Automated eosinophil count            0.5 10*3/uL            0.0-0.3        

 

 Automated blood basophil count (count/volume)            0.1 10*3/uL          
  0.0-0.1        









 Comprehensive metabolic panel - 18 05:54         









 Serum or plasma sodium measurement (moles/volume)            138 mmol/L       
     135-145        

 

 Serum or plasma potassium measurement (moles/volume)            3.8 mmol/L    
        3.6-5.0        

 

 Serum or plasma chloride measurement (moles/volume)            107 mmol/L     
               

 

 Carbon dioxide            19 mmol/L            21-32        

 

 Serum or plasma anion gap determination (moles/volume)            12 mmol/L   
         5-14        

 

 Serum or plasma urea nitrogen measurement (mass/volume)            19 mg/dL   
         7-18        

 

 Serum or plasma creatinine measurement (mass/volume)            0.94 mg/dL    
        0.60-1.30        

 

 Serum or plasma urea nitrogen/creatinine mass ratio            20             
NRG        

 

 Serum or plasma creatinine measurement with calculation of estimated 
glomerular filtration rate            56             NRG        

 

 Serum or plasma glucose measurement (mass/volume)            145 mg/dL        
            

 

 Serum or plasma calcium measurement (mass/volume)            9.2 mg/dL        
    8.5-10.1        

 

 Serum or plasma total bilirubin measurement (mass/volume)            1.0 mg/dL
            0.1-1.0        

 

 Serum or plasma alkaline phosphatase measurement (enzymatic activity/volume)  
          73 U/L                    

 

 Serum or plasma aspartate aminotransferase measurement (enzymatic activity/
volume)            26 U/L            5-34        

 

 Serum or plasma alanine aminotransferase measurement (enzymatic activity/volume
)            20 U/L            0-55        

 

 Serum or plasma protein measurement (mass/volume)            6.9 g/dL         
   6.4-8.2        

 

 Serum or plasma albumin measurement (mass/volume)            4.2 g/dL         
   3.2-4.5        









 THYROID STIMULATING HORMONE - 18 05:54         









 THYROID STIMULATING HORMONE            14.86 u[iU]/mL            0.35-4.94    
    









 Serum or plasma thyroxine (T4) free measurement (mass/volume) - 18 05:54
         









 Serum or plasma thyroxine (T4) free measurement (mass/volume)            0.89 
ng/dL            0.70-1.48        



                      



Encounters

      





 ACCT No.            Visit Date/Time            Discharge            Status    
        Pt. Type            Provider            Facility            Loc./Unit  
          Complaint        

 

 [id number]            2018 10:13:00            2018 23:59:59    
        CLS            Outpatient            DOMENIC VANCE APRN            
[facility]            RAD            FALL,RIGHT RIB PAIN 
       

 

 [id number]            2017 00:11:00            2017 23:59:59    
        CLS            Preadmit            JASON CHACON MD            [facility]            ONC                     

 

 [id number]            2017 12:58:00            2017 00:01:00    
        DIS            Outpatient            JASON CHACON MD            [facility]            ONC                     

 

 [id number]            2017 13:59:00            2017 15:47:00    
        DIS            Emergency            EVELIO BARTHOLOMEW            
[facility]            ER            FALL / R ARM        

 

 [id number]            2017 10:00:00            2017 23:59:59    
        CLS            Outpatient            YAZMIN CHAIREZ MD            [facility]            RAD            SCREENING        

 

 [id number]            2017 11:17:00            2017 23:59:59    
        CLS            Outpatient            BRONWYN AVLENZUELA          
  [facility]            RAD            COUGH,FALL,RT RIB 
PAIN        

 

 [id number]            2016 12:33:00            2016 00:01:00    
        DIS            Outpatient            JASON CHACON MD            [facility]            ONC                     

 

 [id number]            07/15/2016 13:09:00            2016 15:10:00    
        DIS            Outpatient            EDUARDO WILSON MD            
[facility]            REHAB            R SHOULDER PAIN   
     

 

 [id number]            2016 08:21:00            2016 23:59:59    
        CLS            Outpatient            BRONWYN VALENZUELA          
  [facility]            RAD            R SHOULDER PAIN   
     

 

 [id number]            2016 01:29:00            2016 14:10:00    
        DIS            Inpatient            ROSE GUADARRAMA DO            [facility]            4TH            SMALL BOWEL OBSTRUCTION 
DUE TO ADHESIONS        

 

 [id number]            2015 12:48:00            2015 23:59:59    
        CLS            Outpatient            JASON CHACON MD            [facility]            ONC                     

 

 [id number]            10/22/2015 10:10:00            10/23/2015 12:30:00    
        DIS            Inpatient            EDUARDO WISLON MD            [facility]            CSD            CHEST PAIN        

 

 [id number]            2015 12:51:00            2015 00:01:00    
        DIS            Outpatient            JASON CHACON MD            [facility]            ONC                     

 

 [id number]            2015 10:04:00            2015 23:59:59    
        CLS            Outpatient            EDUARDO WILSON MD            
[facility]            RAD            SCREENING,L CHEST 
WALL NODULE        

 

 [id number]            2014 13:34:00            2014 23:59:59    
        CLS            Outpatient            JASON CHACON MD            [facility]            ONC                     

 

 [id number]            10/15/2014 07:58:00            10/15/2014 23:59:59    
        CLS            Outpatient            DRE WRIGHT    
        [facility]            CARD            CVA,CP,HTN,
NIDDM        

 

 [id number]            10/01/2014 08:25:00            10/01/2014 23:59:59    
        CLS            Outpatient            CLAUDIA DIXON MD            [facility]            SDC            HX POLYPS        

 

 [id number]            2014 07:36:00            2014 23:59:59    
        CLS            Outpatient            CLAUDIA DIXON MD            [facility]            PREOP            HX POLYPS        

 

 [id number]            2014 14:29:00            2014 00:01:00    
        DIS            Outpatient            JASON CHACON MD            [facility]            ONC                     

 

 [id number]            2014 09:24:00            2014 23:59:59    
        CLS            Outpatient            JASON CHACON MD            [facility]            RAD            HX OF COLON CA        

 

 [id number]            2014 14:06:00            2014 00:01:00    
        DIS            Outpatient            JASON CHACON MD            [facility]            ONC                     

 

 [id number]            2014 10:04:00            2014 19:35:00    
        DIS            Inpatient            EDUARDO WILSON MD            [facility]            CSD            CHEST PAIN        

 

 [id number]            01/15/2014 08:08:00            01/15/2014 12:10:00    
        DIS            Outpatient            CLAUDIA DIXON MD            [facility]            SDC            HISTORY OF POLYPS        

 

 [id number]            2014 07:17:00            2014 23:59:59    
        CLS            Outpatient            CLAUDIA DIXON MD            [facility]            PREOP            HISTORY OF POLYPS     
   

 

 [id number]            2013 12:29:00            2013 23:59:59    
        CLS            Outpatient            ELIAS SHEPPARD, SHRUTI MELGOZA            [facility]            CARD            CVA,HTN,HYPOTHYROIDISM 
       

 

 [id number]            2013 12:37:00            2013 23:59:59    
        CLS            Outpatient            BRONWYN VALENZUELA          
  [facility]            RAD            SCREENING        

 

 [id number]            2013 13:39:00            2013 23:59:59    
        CLS            Outpatient            NATA PERRY MD            
[facility]            RAD            RT RADICULOPATHY    
    

 

 [id number]            09/10/2013 10:46:00            09/10/2013 16:15:00    
        DIS            Outpatient            GAYLA DONOHUE DO            [facility]            BLOOD IN STOOLS        

 

 [id number]            2013 07:11:00            2013 23:59:59    
        CLS            Outpatient            GAYLA DONOHUE DO            [facility]            PREOP            BLOOD IN STOOLS        

 

 [id number]            2013 11:44:00            2013 11:50:00    
        DIS            Inpatient            ROSE BARTH MD            [facility]            IRF            POST OP KNEE REPLACEMENT
        

 

 [id number]            2013 08:46:00            2013 11:43:00    
        DIS            Inpatient            NATA PERRY MD            [facility]            SURGICAL            SEVERE ARTHRITIS 
RIGHT KNEE        

 

 [id number]            2013 08:14:00            2013 23:59:59    
        CLS            Outpatient            NATA PERRY MD            
[facility]            PREOP            RT KNEE SEVERE 
OSTEOARTHRITIS        

 

 [id number]            2013 15:53:00            2013 23:59:59    
        CLS            Outpatient            EDUARDO WILSON MD            
[facility]            RAD            KNEE PAIN,LEG PAIN  
      

 

 [id number]            2018 07:45:00                         ACT       
     Inpatient            MIHAI SHEPPARD, DEXTER ODONNELL            [facility]            4TH            FALL W/ RT HIP PAIN   FX RT SI JOINT        

 

 [id number]            2016 20:30:00                                   
   Document Registration                                                       
     

 

 [id number]            2016 20:30:00                                   
   Document Registration                                                       
     

 

 [id number]            2016 20:30:00                                   
   Document Registration                                                       
     

 

 [id number]            2013 10:44:00                                   
   Document Registration                                                       
     

 

 [id number]            2012 09:48:00                                   
   Document Registration                                                       
     

 

 [id number]            2012 10:38:00                                   
   Document Registration                                                       
     

 

 [id number]            2012 08:06:00                                   
   Document Registration                                                       
     

 

 [id number]            2012 13:45:00                                   
   Document Registration                                                       
     

 

 [id number]            2011 13:18:00                                   
   Document Registration                                                       
     

 

 [id number]            2011 08:44:00                                   
   Document Registration                                                       
     

 

 [id number]            2011 12:34:00                                   
   Document Registration                                                       
     

 

 [id number]            2015 12:52:22                         [place]            
Document Registration                                                          
  

 

 [id number]            2016 15:41:26            2016 23:59:59            
CLS            Outpatient

## 2018-07-13 NOTE — DIAGNOSTIC IMAGING REPORT
PROCEDURE: CT chest, abdomen, and pelvis without contrast.



TECHNIQUE: Multiple contiguous axial images were obtained through

the chest, abdomen, and pelvis without the use of intravenous

contrast.



INDICATION:  Recent fall. Pain.



Comparison: 06/18/2016 



Findings:

CT chest: Evaluation of the lung fields demonstrates no focal

consolidation, pleural effusion, nor pneumothorax. There is a 4

mm subpleural micronodule within the posterior margins superior

segment of the left lower lobe (image 32, series 2). 3 mm

micronodule is present within the right upper lobe (image 23,

series 2). Small 3 mm micronodules also noted within the lateral

segment of the right middle lobe (image 34, series 2). Finally,

punctate subpleural micronodule seen within the lateral margins

of the left lower lobe and measures approximately 2 mm (image 45,

series 2).



Cardio mediastinal structures show normal heart size. There is no

large pericardial effusion. There is advanced calcified coronary

atherosclerosis. Note is also made of diffuse calcified aortic

atherosclerosis. No pathologically enlarged or morphologically

abnormal adenopathy is seen within the mediastinum, huang, nor

axilla on this noncontrast exam.



Osseous structures show age-related degenerative changes. No

lytic or blastic bony lesions are identified.



CT abdomen: Normal appendix cannot be adequately identified, but

there is no pericecal inflammation. Small bowel loops are

nondistended.



Bilateral nonobstructive renal calculi are noted and are likely

vascular in nature. Otherwise, kidneys, adrenal glands, spleen,

pancreas, and liver have an unremarkable noncontrast CT

appearance. There is no loculated fluid collection, free fluid,

nor free air within the abdomen. No abnormal mesenteric or

retroperitoneal adenopathy is seen. There is diffuse calcified

aortic and arterial atherosclerosis.



Bony structures show no acute abnormalities.



CT pelvis: Evaluation of the osseous structures demonstrates

acute fracture extending through the posterior superior margins

of the right iliac near the SI joint (image 83, series 2). No

other definite acute pelvic fractures are identified, although

evaluation is somewhat obscured by metallic artifact from

previous left hip replacement.



Urinary bladder is also heavily obscured. No calculi are seen

within the urinary bladder. There is no loculated fluid

collection, free fluid, nor free air within the pelvis. No large

abnormal adenopathy is seen.



Impression:

1. Acute appearing fracture of the right iliac near the SI joint.

May want to consider MRI of the pelvis to assess for additional

occult fractures. This is not discussed on [facility] report.

Discrepancy will be called.

2. Advanced diffuse calcified aortic, coronary, and arterial

atherosclerosis.

3. Multiple small micronodules within both lungs. If patient is

in a high-risk category such as history of smoking, could

consider one-year followup to ensure stability. Alternatively, if

patient is in a low-risk category, no further followup may be

indicated.



Report was faxed/called to Dr. Myers by [email or web address] at 7:39 am.



SHANNON Beltran, was also notified.



Dictated by: 



  Dictated on workstation # [id number]

## 2018-07-14 VITALS — DIASTOLIC BLOOD PRESSURE: 68 MMHG | SYSTOLIC BLOOD PRESSURE: 150 MMHG

## 2018-07-14 VITALS — SYSTOLIC BLOOD PRESSURE: 99 MMHG | DIASTOLIC BLOOD PRESSURE: 58 MMHG

## 2018-07-14 VITALS — DIASTOLIC BLOOD PRESSURE: 60 MMHG | SYSTOLIC BLOOD PRESSURE: 119 MMHG

## 2018-07-14 VITALS — SYSTOLIC BLOOD PRESSURE: 128 MMHG | DIASTOLIC BLOOD PRESSURE: 61 MMHG

## 2018-07-14 VITALS — DIASTOLIC BLOOD PRESSURE: 65 MMHG | SYSTOLIC BLOOD PRESSURE: 144 MMHG

## 2018-07-14 RX ADMIN — FENTANYL CITRATE PRN MCG: 50 INJECTION, SOLUTION INTRAMUSCULAR; INTRAVENOUS at 13:34

## 2018-07-14 RX ADMIN — HYDROCODONE BITARTRATE AND ACETAMINOPHEN PRN TAB: 5; 325 TABLET ORAL at 05:29

## 2018-07-14 RX ADMIN — ATENOLOL SCH MG: 50 TABLET ORAL at 08:46

## 2018-07-14 RX ADMIN — HYDROCODONE BITARTRATE AND ACETAMINOPHEN PRN TAB: 5; 325 TABLET ORAL at 20:13

## 2018-07-14 RX ADMIN — ASPIRIN SCH MG: 325 TABLET, COATED ORAL at 20:13

## 2018-07-14 RX ADMIN — POLYETHYLENE GLYCOL (3350) SCH GM: 17 POWDER, FOR SOLUTION ORAL at 20:14

## 2018-07-14 RX ADMIN — ENOXAPARIN SODIUM SCH MG: 30 INJECTION SUBCUTANEOUS at 15:43

## 2018-07-14 RX ADMIN — HYDROCODONE BITARTRATE AND ACETAMINOPHEN PRN TAB: 5; 325 TABLET ORAL at 12:56

## 2018-07-14 RX ADMIN — ATORVASTATIN CALCIUM SCH MG: 40 TABLET, FILM COATED ORAL at 20:13

## 2018-07-14 RX ADMIN — LEVOTHYROXINE SODIUM SCH MCG: 100 TABLET ORAL at 05:27

## 2018-07-14 NOTE — CONSULTATION
History of Present Illness


History of Present Illness


Patient Consulted On(merissa/time)


18


 14:26


Date Seen by Provider:  2018


Time Seen by Provider:  14:10


Reason for Visit:  Pain right hip after a fall


History of Present Illness


Fell down stairs  Xrays reveal a nondispaced right acute posterior ilium 

fracture





Allergies and Home Medications


Allergies


Coded Allergies:  


     Sulfa (Sulfonamide Antibiotics) (Verified  Allergy, Unknown, 18)


 


 PT DOES NOT KNOW REACTION TO MED





Home Medications


Aspirin 325 Mg Tablet.dr, 325 MG PO HS, (Reported)


Atenolol 50 Mg Tablet, 50 MG PO DAILY, (Reported)


   LAST FILLED #30 18 


Atorvastatin Calcium 40 Mg Tablet, 40 MG PO HS, (Reported)


   LAST FILLED #30 18 


Cholecalciferol (Vitamin D3) 1,000 Unit Capsule, 1,000 UNIT PO DAILY, (Reported)


Krill/Om-3/Dha/Epa/Phospho/Ast 1 Each Capsule, 1 CAP PO HS, (Reported)


Levothyroxine Sodium 100 Mcg Tablet, 100 MCG PO DAILY, (Reported)


   LAST FILLED #30 18 


Multivitamin 1 Each Tablet, 1 TAB PO DAILY, (Reported)


Vitamin E Acetate 400 Unit Capsule, 400 UNIT PO DAILY, (Reported)





Patient Home Medication List


Home Medication List Reviewed:  Yes





Past Medical-Social-Family Hx


Past Med/Social Hx:  Reviewed Nursing Past Med/Soc Hx


Patient Social History


Alcohol Use:  Denies Use


Recreational Drug Use:  No


Smoking Status:  Current Everyday Smoker


2nd Hand Smoke Exposure:  No


Recent Foreign Travel:  No


Contact w/Someone Who Travel:  No


Recent Infectious Disease Expo:  No


Recent Hopitalizations:  No


Physical Abuse:  No


Sexual Abuse:  No





Immunizations Up To Date


Tetanus Booster (TDap):  Unknown


PED Vaccines UTD:  No


Date of Pneumonia Vaccine:  Oct 1, 2013


Date of Influenza Vaccine:  Oct 1, 2013





Seasonal Allergies


Seasonal Allergies:  No





Past Medical History


Surgeries:  Yes (RIGHT KNEE AND LEFT HIP REPLACEMENTS)


Gallbladder, Hysterectomy, Joint Replacement, Orthopedic


Respiratory:  Yes (AT AGE 9 )


Pneumonia


Cardiac:  Yes


Heart Murmur, Hypertension, Valvular Heart Disease


Neurological:  No


Reproductive Disorders:  No


Genitourinary:  Yes


Bladder Infection


Gastrointestinal:  Yes


Obstructive Bowel, Chronic Constipation, Polyps


Musculoskeletal:  Yes (RIGH KNEE REPLACEMENT/LT HIP REPLACEMENT/ GENERALIZED 

ATHRITIS)


Arthritis, Fractures


Endocrine:  No


Hypothyroidsim


HEENT:  Yes


Cataract


Hearing Impairment:  Hard of Hearing


Cancer:  Yes (POS  POLYP  3/14)


Psychosocial:  Yes


Depression


Nursing Suicide Risk Score:  0


Integumentary:  No


Blood Disorders:  No


Adverse Reaction/Blood Tranf:  No





Family Medical History


Reviewed Nursing Family Hx





Myocardial infarction


  19 FATHER, Onset:86 (FATHER  OF UNKNOWN HEART PROBLEMS, PT STATES IT WAS 

PROBABLY A HEART ATTACK)


  19 MOTHER, Onset:70


  G8 BROTHER, Onset:46


  G8 SISTER


Heart Disease, Hypertension





Physical Exam-General Problems


Physical Exam


Vital Signs





Vital Signs - First Documented








 18





 09:02


 


O2 Flow Rate 0





Capillary Refill : Less Than 3 SecondsLess Than 3 Seconds





Assessment/Plan


Assessment/Plan


Admission Diagnosis/Plan


Nondisplaced acute right posterior ilium fracture  Consult dictated  No surgery 

required  Weight bearing as tolerated  Pain control





Clinical Quality Measures


DVT/VTE Risk/Contraindication:


Risk Factor Score Per Nursin


RFS Level Per Nursing on Admit:  4+=Very High











KYLE BUENROSTRO DO 2018 2:30 pm

## 2018-07-14 NOTE — PHYSICAL THERAPY DAILY NOTE
PT Daily Note-Current


Subjective


Patient agrees to PT.  No c/o and denies pain.





Pain





   Numeric Pain Scale:  0-No Pain


   Location:  No Pain Reported





Mental Status


Patient Orientation:  Confused





Transfers


              Functional [place] Measure


0=Not Assessed/NA   4=Minimal Assistance


1=Total Assistance   5=Supervision or Setup


2=Maximal Assistance   6=Modified [place]


3=Moderate Assistance   7=Complete IndependenceIRFPAI Quality Coding Scale











6 Independent with activity with or without an assistive device


 


5  Patient requires set up or clean up by helper.  Patient completes activity  

by  themselves


 


4 Supervision or touching assist (CGA). [place] provide cues , steadying assist


 


3 The helper provides less than half the effort to complete the activity


 


2 The helper provides more than half the effort to complete the activity


 


1 Dependent.  The helper does all the effort to complete an activity 


 


7 Patient refused to complete or attempt activity


 


9 The patient did not perform the activity before the current illness or injury


 


88 Not attempted due to Medical conditions or safety concerns








Transfers (B, C, W/C) (FIM):  5


Scootin


Rollin


Supine to/from Sit:  5


Sit to/from Stand:  5





Weight Bearing


Right Lower Extremity:  Right


Weight Bearing/Tolerated


Left Lower Extremity:  Left


Weight Bearing/Tolerated





Gait Training


Gait (FIM):  5


Distance (FIM):  3=150 ft


Distance:  400'


Gait Level of Assist:  5


Gait Assistive Device:  FWW


slow, steady, functional, no deviation





Assessment


Patient is up in recliner with chair alarm activated.  Noted confusion with 

inability to remember why she is in the hospital.  PT to increase activity as 

tolerated by patient.





PT Long Term Goals


Long Term Goals


PT Long Term Goals Time Frame:  2018


Transfers (B,C,W/C) (FIM):  6


Gait (FIM):  6


Gait distance (FIM):  3=150 ft


Distance:  250'


Gait Level of Assist:  6


Gait Assistive Device:  FWW


Stairs (FIM):  5


# of Steps:  4


Stairs Level Of Assist:  5





PT Plan


Treatment/Plan


Treatment Plan:  Continue Plan of Care


Treatment Plan:  Bed Mobility, Education, Functional Activity Felisha, Functional 

Strength, Gait, Safety, Therapeutic Exercise


Treatment Duration:  2018


Frequency:  6 times per week


Estimated Hrs Per Day:  .25 hour per day


Patient and/or Family Agrees t:  Yes





Time/GCodes


Time In:  730


Time Out:  745


Total Billed Treatment Time:  15


Total Billed Treatment


1 visit


GT 15 min





PT/OT Therapy GCodes


Therapy


Functional Limitation:  Physical Therapy


Test(s)/Tool used to determine:  FIM





Functional Limitation-Current


Charge Code:  MICHELLE


Modifier:  SIL





Functional Limitation-Goal


Charge Code:  MOBSHENA


Modifier:  SASKIA CORDON PT 2018 08:20

## 2018-07-14 NOTE — CONSULTATION REPORT
DATE OF SERVICE:  07/14/2018



ORTHOPEDIC CONSULTATION



IMPRESSION:

1.  Acute nondisplaced right posterior ilium fracture.

2.  Right lumbar scoliosis.

3.  Lumbar spondylosis.

4.  Lumbar degenerative disk disease.

5.  Status post fall.



RECOMMENDATIONS:

1.  _____ ambulation, weightbearing as tolerated on the right.

2.  Pain control.



HISTORY AND PHYSICAL EXAM:

The patient is an 89-year-old female, who was seen at the request of Dr. Moore

with chief complaint of right hip pain.  The patient states that she had fallen

down backwards down some steps.  She had difficulty ambulating after this.  She

_____ and when subsequently was evaluated in the emergency room where a CT scan

and MRI evaluation revealed a fracture through the posterior ilium on the right

with no evidence of displacement noted.  No extension in the sacroiliac joint

was noted.  The patient was hospitalized for physical therapy and pain

management.  I have reviewed the patient's imaging including the CT scan and the

MRI in yesterday's date.  I spoke with Dr. Moore yesterday and advised the

patient to begin ambulation, weightbearing as tolerated with the physical

therapist.  The patient was seen and examined on today's date.  On exam, the

patient's leg lengths are symmetric.  There is no swelling in the right hip or

the right lower extremity.  She has a well-healed incisional scar from her

previous total knee arthroplasty on the right.  The patient has bruising along

the iliac crest to a mild degree superiorly on the lateral aspect of her right

hip.  She has no evidence of bruising or swelling in the lumbar spine or in the

posterior buttock on the right.  The patient has an incisional scar well-healed

on the left hip from a previous total hip arthroplasty.  Neurovascularly, the

right lower extremity is intact.  Leg lengths are symmetric.



The patient's CT scan as well as her MRI were reviewed.  The patient

demonstrates evidence of a previous total hip arthroplasty on the left.  Plain

film x-rays demonstrate no evidence of fracture on the right.  The patient's CT

scan demonstrates a nondisplaced posterior ilium fracture on the right with no

extension in the sacroiliac joint.  The MRI demonstrates edematous changes at

this location.  The patient demonstrates a right lumbar scoliosis.  She has no

acute bony abnormalities noted.  She has significant lumbar degenerative disk

disease at L2-L3 and L3-L4 with spondylitic changes throughout the lumbar spine.



The patient has nondisplaced fracture not involving the sacroiliac joint.  There

is no involvement of the acetabulum on the right.  She states that she is moving

fairly slowly with the physical therapist.  She does normally live alone.  I

would recommend the patient continue with her physical therapy.  She may need

some rehabilitation based on her persistent need for narcotic pain medicine for

pain control to allow the patient to return to independent living at home.  I

discussed the fact that she would not need any further follow up as far as

x-rays based on the nondisplaced fracture and the location of the fracture

itself.



Thank you very much for allowing me to participate in this patient's care.





Job ID: [id number]

DocumentID: [id number]

Dictated Date:  07/14/2018 14:25:11

Transcription Date: 07/14/2018 15:35:00

Dictated By: KYLE BUENROSTRO DO

## 2018-07-14 NOTE — PROGRESS NOTE-HOSPITALIST
Subjective


HPI/CC On Admission


Date Seen by Provider:  2018


Time Seen by Provider:  10:00


Pt is an 89yoCF who presented to the ER due to hip pain following a fall 

yesterday. She states she was walking down steps and her knee gave out and she 

fell down the a few stairs on the cement. She hit her head and hip and was not 

able to get up for a while but eventually was able to stand get around. Her 

pain continued to worsen and her friend insisted she come in today because she 

could only take a few steps. She had an CT which revealed a nondisplaced pelvis 

fracture and MRI confirmed this. Due to her very limited mobility and continued 

pain she was admitted for pain control and orthopedic evaluation. Of note she 

did complain to her nurse about chest pain. She originally denied this during 

my exam and then said she did have some chest pain early this morning but has 

not had it since arrival.


Subjective/Events-last exam


Pt reports doing well. Pain improving. Working with PT well. Consider IRU eval.





Objective


Exam


Vital Signs





Vital Signs








  Date Time  Temp Pulse Resp B/P (MAP) Pulse Ox O2 Delivery O2 Flow Rate FiO2


 


18 08:00 97.2 91 18 128/61 (83) 90 Room Air  


 


18 09:02       0 





Capillary Refill : Less Than 3 SecondsLess Than 3 Seconds


General Appearance:  No Apparent Distress, WD/WN


Respiratory:  Lungs Clear, No Respiratory Distress


Cardiovascular:  Regular Rate, Rhythm, No Murmur


Gastrointestinal:  Normal Bowel Sounds, Non Tender, Soft


Neurologic/Psychiatric:  Alert, Oriented x3





Results/Procedures


Lab


Patient resulted labs reviewed.


Imaging:  Reviewed Imaging Report





Assessment/Plan


Assessment and Plan


Assess & Plan/Chief Complaint


Pelvis fracture





Diagnosis/Problems


Diagnosis/Problems





(1) Fracture, pelvis closed


Status:  Acute


Assessment & Plan:  Nondisplaced fracture noted on imaging


Ortho consulted, appreciate recs


I discussed with Dr Parada- no operative repair needed


PT/OT consulted


Will consult IRU


Fentanyl for severe pain and orals for moderate


Qualifiers:  


   Encounter type:  initial encounter  Pelvic bone location:  unspecified part 

of pelvis  Fracture alignment:  nondisplaced  Qualified Codes:  S32.9XXA - 

Fracture of unspecified parts of lumbosacral spine and pelvis, initial 

encounter for closed fracture


(2) Hypertension


Status:  Chronic


Assessment & Plan:  BP well controlled, trend


Qualifiers:  


   Hypertension type:  essential hypertension  Qualified Codes:  I10 - 

Essential (primary) hypertension


(3) Hypothyroid


Status:  Chronic


Assessment & Plan:  Resume home medicines


   Concern that high TSH due to noncompliance


Qualifiers:  


   Hypothyroidism type:  acquired  Qualified Codes:  E03.9 - Hypothyroidism, 

unspecified


(4) Chest pain of uncertain etiology


Status:  Acute


Assessment & Plan:  Now resolved


Troponin negative


EKG unchanged from previous, no STEMI


Monitor





(5) Prophylactic measure


Assessment & Plan:  Lovenox


Reg Diet


Saline lock








Clinical Quality Measures


DVT/VTE Risk/Contraindication:


Risk Factor Score Per Nursin


RFS Level Per Nursing on Admit:  4+=Very High











DEXTER HOLM MD 2018 10:06 am

## 2018-07-15 VITALS — SYSTOLIC BLOOD PRESSURE: 117 MMHG | DIASTOLIC BLOOD PRESSURE: 56 MMHG

## 2018-07-15 VITALS — DIASTOLIC BLOOD PRESSURE: 68 MMHG | SYSTOLIC BLOOD PRESSURE: 134 MMHG

## 2018-07-15 VITALS — DIASTOLIC BLOOD PRESSURE: 58 MMHG | SYSTOLIC BLOOD PRESSURE: 131 MMHG

## 2018-07-15 VITALS — SYSTOLIC BLOOD PRESSURE: 158 MMHG | DIASTOLIC BLOOD PRESSURE: 71 MMHG

## 2018-07-15 RX ADMIN — POLYETHYLENE GLYCOL (3350) SCH GM: 17 POWDER, FOR SOLUTION ORAL at 21:16

## 2018-07-15 RX ADMIN — ATORVASTATIN CALCIUM SCH MG: 40 TABLET, FILM COATED ORAL at 20:49

## 2018-07-15 RX ADMIN — LEVOTHYROXINE SODIUM SCH MCG: 100 TABLET ORAL at 05:19

## 2018-07-15 RX ADMIN — ACETAMINOPHEN PRN MG: 500 TABLET ORAL at 17:46

## 2018-07-15 RX ADMIN — ENOXAPARIN SODIUM SCH MG: 30 INJECTION SUBCUTANEOUS at 15:38

## 2018-07-15 RX ADMIN — ATENOLOL SCH MG: 50 TABLET ORAL at 08:11

## 2018-07-15 RX ADMIN — ASPIRIN SCH MG: 325 TABLET, COATED ORAL at 20:49

## 2018-07-15 NOTE — PROGRESS NOTE-HOSPITALIST
Subjective


HPI/CC On Admission


Date Seen by Provider:  Jul 15, 2018


Time Seen by Provider:  09:22


Pt is an 89yoCF who presented to the ER due to hip pain following a fall 

yesterday. She states she was walking down steps and her knee gave out and she 

fell down the a few stairs on the cement. She hit her head and hip and was not 

able to get up for a while but eventually was able to stand get around. Her 

pain continued to worsen and her friend insisted she come in today because she 

could only take a few steps. She had an CT which revealed a nondisplaced pelvis 

fracture and MRI confirmed this. Due to her very limited mobility and continued 

pain she was admitted for pain control and orthopedic evaluation. Of note she 

did complain to her nurse about chest pain. She originally denied this during 

my exam and then said she did have some chest pain early this morning but has 

not had it since arrival.


Subjective/Events-last exam


Pt reports doing well. She denies pain at rest. States she's not walking though 

so she doesn't get pain. She is requesting DC home. Friend at bedside expresses 

concern about discharging to home.





Objective


Exam


Vital Signs





Vital Signs








  Date Time  Temp Pulse Resp B/P (MAP) Pulse Ox O2 Delivery O2 Flow Rate FiO2


 


7/15/18 04:00 98.8 62 18 131/58 (82) 95 Room Air  


 


18 09:02       0 





Capillary Refill : Less Than 3 SecondsLess Than 3 Seconds


General Appearance:  No Apparent Distress, WD/WN


Respiratory:  Lungs Clear, No Respiratory Distress


Cardiovascular:  Regular Rate, Rhythm, No Murmur


Gastrointestinal:  Normal Bowel Sounds, Non Tender, Soft


Neurologic/Psychiatric:  Alert, Other (oriented to person and place)





Results/Procedures


Lab


Patient resulted labs reviewed.


Imaging:  Reviewed Imaging Report





Assessment/Plan


Assessment and Plan


Assess & Plan/Chief Complaint


Pelvis fracture





Diagnosis/Problems


Diagnosis/Problems





(1) Fracture, pelvis closed


Status:  Acute


Assessment & Plan:  Nondisplaced fracture noted on imaging


Ortho consulted, appreciate recs


I discussed with Dr Parada- no operative repair needed


PT/OT consulted


Will consult IRU to eval tomorrow


   If not a candidate would benefit from home health


Fentanyl for severe pain and orals for moderate


Qualifiers:  


   Encounter type:  initial encounter  Pelvic bone location:  unspecified part 

of pelvis  Fracture alignment:  nondisplaced  Qualified Codes:  S32.9XXA - 

Fracture of unspecified parts of lumbosacral spine and pelvis, initial 

encounter for closed fracture


(2) Hypertension


Status:  Chronic


Assessment & Plan:  BP well controlled for age, trend


Qualifiers:  


   Hypertension type:  essential hypertension  Qualified Codes:  I10 - 

Essential (primary) hypertension


(3) Hypothyroid


Status:  Chronic


Assessment & Plan:  Resume home medicines


   Concern that high TSH due to noncompliance


Qualifiers:  


   Hypothyroidism type:  acquired  Qualified Codes:  E03.9 - Hypothyroidism, 

unspecified


(4) Chest pain of uncertain etiology


Status:  Acute


Assessment & Plan:  Now resolved


Troponin negative


EKG unchanged from previous, no STEMI


Monitor





(5) Prophylactic measure


Assessment & Plan:  Lovenox


Reg Diet


Saline lock








Clinical Quality Measures


DVT/VTE Risk/Contraindication:


Risk Factor Score Per Nursin


RFS Level Per Nursing on Admit:  4+=Very High











DEXTER HOLM MD Jul 15, 2018 9:24 am

## 2018-07-16 VITALS — DIASTOLIC BLOOD PRESSURE: 75 MMHG | SYSTOLIC BLOOD PRESSURE: 156 MMHG

## 2018-07-16 VITALS — DIASTOLIC BLOOD PRESSURE: 69 MMHG | SYSTOLIC BLOOD PRESSURE: 159 MMHG

## 2018-07-16 RX ADMIN — LEVOTHYROXINE SODIUM SCH MCG: 100 TABLET ORAL at 05:36

## 2018-07-16 RX ADMIN — ATENOLOL SCH MG: 50 TABLET ORAL at 08:19

## 2018-07-16 RX ADMIN — HYDROCODONE BITARTRATE AND ACETAMINOPHEN PRN TAB: 5; 325 TABLET ORAL at 08:20

## 2018-07-16 NOTE — DISCHARGE INSTRUCTIONS
Discharge Instructions


Patient Instructions


Patient Instructions:  


Medications as listed on your discharge sequence.


Use a walker at all times


Home physical therapy has been ordered to improve your strength and skills





Activity & Diet


Discharge Diet:  No Restrictions


Activity as Tolerated:  Yes











RANDOLPH RAO MD Jul 16, 2018 12:47

## 2018-07-16 NOTE — PHYSICAL THERAPY DAILY NOTE
PT Daily Note-Current


Subjective


Patient reports she just returned to bed, but agrees to PT.





Pain





   Numeric Pain Scale:  0-No Pain


   Location:  No Pain Reported





Mental Status


Patient Orientation:  Person, Time, Situation





Transfers


              Functional [place] Measure


0=Not Assessed/NA   4=Minimal Assistance


1=Total Assistance   5=Supervision or Setup


2=Maximal Assistance   6=Modified [place]


3=Moderate Assistance   7=Complete IndependenceIRFPAI Quality Coding Scale











6 Independent with activity with or without an assistive device


 


5  Patient requires set up or clean up by helper.  Patient completes activity  

by  themselves


 


4 Supervision or touching assist (CGA). [place] provide cues , steadying assist


 


3 The helper provides less than half the effort to complete the activity


 


2 The helper provides more than half the effort to complete the activity


 


1 Dependent.  The helper does all the effort to complete an activity 


 


7 Patient refused to complete or attempt activity


 


9 The patient did not perform the activity before the current illness or injury


 


88 Not attempted due to Medical conditions or safety concerns








Transfers (B, C, W/C) (FIM):  5


Scootin


Rollin


Supine to/from Sit:  5


Sit to/from Stand:  5





Weight Bearing


Right Lower Extremity:  Right


Weight Bearing/Tolerated


Left Lower Extremity:  Left


Weight Bearing/Tolerated





Gait Training


Gait (FIM):  5


Distance (FIM):  3=150 ft


Distance:  600'


Gait Level of Assist:  5


Gait Assistive Device:  FWW


steady, functional gait sequence with FWW





Stair Training


 Stair Training: Handrails/:  2 handrails


Stairs (FIM):  5


#of Steps:  12


Stairs:  Pattern:  Reciprocal


Level of Assist:  5





Assessment


Patient tolerated treatment well and returned to bed with bed alarm activated.  

Patient repeats she desires to return to home today.  From a PT standpoint, 

patient's gross motor skills are functional, however, her cognition is of 

concern for safety awareness.  PT voiced this with SW.





PT Long Term Goals


Long Term Goals


PT Long Term Goals Time Frame:  2018


Transfers (B,C,W/C) (FIM):  6


Gait (FIM):  6


Gait distance (FIM):  3=150 ft


Distance:  250'


Gait Level of Assist:  6


Gait Assistive Device:  FWW


Stairs (FIM):  5


# of Steps:  4


Stairs Level Of Assist:  5





PT Plan


Treatment/Plan


Treatment Plan:  Continue Plan of Care


Treatment Plan:  Bed Mobility, Education, Functional Activity Felisha, Functional 

Strength, Gait, Safety, Therapeutic Exercise


Treatment Duration:  2018


Frequency:  6 times per week


Estimated Hrs Per Day:  .25 hour per day


Patient and/or Family Agrees t:  Yes





Time/GCodes


Time In:  930


Time Out:  953


Total Billed Treatment Time:  23


Total Billed Treatment


1 visit


FA x 2 23 min





PT/OT Therapy GCodes


Therapy


Functional Limitation:  Physical Therapy


Test(s)/Tool used to determine:  FIM





Functional Limitation-Current


Charge Code:  MOBRUTHANN


Modifier:  SIL





Functional Limitation-Goal


Charge Code:  MOBGUILLERMINAAL


Modifier:  SASKIA CORDON PT 2018 10:44

## 2018-07-16 NOTE — PROGRESS NOTE-HOSPITALIST
Progress Note


Progress Notes/Assess & Plan


Date Seen


7/16/18


Time Seen by Provider:  11:50


Assessment & Plan


The patient was admitted after suffering a fall in her home.  X-rays showed a 

right iliac fracture near the SI joint.  No hip fracture was noted.  MRI was 

performed to further evaluate and did not show any new fractures.  She was 

having a great deal of problems dealing with the pain and ambulation.  She has 

improved in that regard.  She does not wish any consideration of short-term 

nursing home placement to work her way through this.  She will agree to home 

physical therapy.





Physical exam: She is sitting in a chair at the bedside.  She is alert and well 

composed.  Lungs are clear to auscultation.  CV is regular.  There is minimal 

pedal edema.





Impression: Generalized debility.  2.fracture of the right iliac near the 

sacroiliac joint.





Plan: See discharge sequence for medications and routines.











RANDOLPH RAO MD Jul 16, 2018 12:44

## 2018-07-16 NOTE — D/C HH FACE TO FACE ORDER
D/C  Face to Face Orders


Instructions for Patient


Patient Instructions/FollowUp:  


See part 1


Physician to follow Patient:  PAULINABLAS John


Discharge Diet for Home:  No Restrictions





Patient Data-Allergies,Ht & Wt


Patient Allergies:  


Coded Allergies:  


     Sulfa (Sulfonamide Antibiotics) (Verified  Allergy, Unknown, 7/13/18)


 


 PT DOES NOT KNOW REACTION TO MED


Height (Feet):  5


Height (Inches):  1.00


Weight (Pounds):  113


Weight (Ounces):  7.0





Home Health Need/Face to Face


Date of Face to Face:  Jul 16, 2018


Clinical Findings:  Pain with ambulation


I have seen Pt face-to-face:  Yes


Discharged To:  Home


Diagnosis/Conditions:  


Fractured pelvis


Patient is Homebound due to:  Donnie fall risk due to instabilty, Pain w/

ambulation


Homebound Status


   Due to the above stated illness, injury or surgical procedure (medical 

condition or diagnosis) and associated clinical findings, the patient is 

homebound because of his/her inability to leave home except with aid of a 

supportive device and/or person AND leaving the home requires a considerable 

and taxing effort or is medically contraindicated.


Pt req the following assistanc:  Walker





Home Health Nursing Orders


Home Health Services Order:  Physical Therapy-Evaluate & Treat





Home Health Infusion Therapy


Line Start Date:  Jul 13, 2018


Line Start Time:  0554


Line Type:  Saline Lock


Site Location:  [place]


Certify Stmt


I certify that this patient is under my care and that I, a nurse practitioner 

or a physician; a assistant working with me, had a face to face encounter that -

meets the physician face to face encounter requirements with this patient as 

dated.   


RANDOLPH Whipple MD Jul 16, 2018 13:38

## 2018-07-16 NOTE — OCCUPATIONAL THER DAILY NOTE
OT Current Status-Daily Note


Subjective


Pt alert, sitting in recliner.  On first attempt to see pt, pt had just gotten 

lunch and demonstrated ability to complete by self.  Second attempt, pt agrees 

to therapy.  Pt stated that she was going home today.  Pt has tub shower and 

steps into tub.  No c/o pain at this time.





Mental Status/Objective


Patient Orientation:  Person, Time


              Functional [place] Measure


0=Not Assessed/NA   4=Minimal Assistance


1=Total Assistance   5=Supervision or Setup


2=Maximal Assistance   6=Modified [place]


3=Moderate Assistance   7=Complete [place]





ADL-Treatment


Per nrsg, pt requires assistance for lower body dressing due to unsteadiness, 

able to don/doff over feet.  Pt stands to don/doff upper body clothing, nrsg 

educated pt on safety, did not demonstrate understanding of safety per nrsg.





Other Treatment


Pt completed UE strengthening exercises against gravity to increase strength 

and activity tolerance.  5 UE exercises completed 1 set 10 reps.  Pt educated 

on completing daily at home.  After therapy, pt sitting in recliner with call 

light/phone in reach.  Safety measures in place.  All needs met.





Education


OT Patient Education:  Exercise program, Safety issues


Teaching Recipient:  Patient


Teaching Methods:  Demonstration, Discussion


Response to Teaching:  Return Demonstration, Reinforcement Needed





OT Short Term Goals


Short Term Goals


1=Demonstrate adherence to instructed precautions during ADL tasks.


2=Patient will verbalize/demonstrate understanding of assistive devices/

modifications for ADL.


3=Patient will improve strength/tolerance for activity to enable patient to 

perform ADL's.





OT Long Term Goals


Long Term Goals


Time Frame:  Jul 20, 2018


Eating (FIM):  6


Grooming(FIM):  6


Bathing(FIM):  6


Upper Body Dressing(FIM):  6


Lower Body Dressing(FIM):  6


Toileting(FIM):  6


Toilet/Commode Transfer(FIM):  6


Shower Transfer(FIM):  5


Additional Goals:  1-Demonstrate ADL Tasks, 2-Verbalize Understanding, 3-

ImproveStrength/Felisha


1=Demonstrate adherence to instructed precautions during ADL tasks.


2=Patient will verbalize/demonstrate understanding of assistive devices/

modifications for ADL.


3=Patient will improve strength/tolerance for activity to enable patient to 

perform ADL's.





OT Education/Plan


Problem List/Assessment


Pt would benefit from skilled OT to increase her independence ini basic self 

care





Discharge Recommendations


Plan/Recommendations:  Continue POC





Treatment Plan/Plan of Care


Patient would benefit from OT for education, treatment and training to promote 

independence in ADL's, mobility, safety and/or upper extremity function for ADL'

s.


Plan of Care:  ADL Retraining, Functional Mobility, UE Funct Exercise/Act, UE 

Neuromus Re-Ed/Coord


Treatment Duration:  Jul 20, 2018


Frequency:  5 times per week


Estimated Hrs Per Day:  .5 hour per day


Agreement:  Yes


Rehab Potential:  Fair





Time/GCodes


Start Time:  13:00


Stop Time:  13:13


Total Time Billed (hr/min):  13


Billed Treatment Time


1 visit-FA 1 (13 min)





PT/OT Therapy GCodes


Therapy


Functional Limitation:  Physical Therapy


Test(s)/Tool used to determine:  FIM





Functional Limitation-Current


Charge Code:  MOBCUR


Modifier:  CJ





Functional Limitation-Goal


Charge Code:  [id number]


Modifier:  MARY PRICE Jul 16, 2018 13:42

## 2018-07-19 ENCOUNTER — HOSPITAL ENCOUNTER (OUTPATIENT)
Dept: HOSPITAL 75 - RAD | Age: 83
End: 2018-07-19
Attending: INTERNAL MEDICINE
Payer: MEDICARE

## 2018-07-19 DIAGNOSIS — R91.8: ICD-10-CM

## 2018-07-19 DIAGNOSIS — S22.31XA: Primary | ICD-10-CM

## 2018-07-19 PROCEDURE — 71046 X-RAY EXAM CHEST 2 VIEWS: CPT

## 2018-07-19 NOTE — DIAGNOSTIC IMAGING REPORT
INDICATION: Pain and abnormal lung sounds.



COMPARISON: 6/25/2018



FINDINGS: 2 views of the chest are obtained. Heart size is

normal. The pulmonary vessels appear unremarkable. There is no

pneumothorax, mediastinal widening or pleural fluid. Right 11th

rib fracture is unchanged. No new abnormality is seen.



IMPRESSION: No acute cardiopulmonary abnormality is demonstrated.

No interval change from the prior study. Right 11th rib fracture

is again noted.



Dictated by: 



  Dictated on workstation # [id number]